# Patient Record
Sex: FEMALE | Race: WHITE
[De-identification: names, ages, dates, MRNs, and addresses within clinical notes are randomized per-mention and may not be internally consistent; named-entity substitution may affect disease eponyms.]

---

## 2017-05-03 NOTE — EMERGENCY DEPARTMENT RECORD
History of Present Illness





- General


Chief complaint: Swelling of legs


Stated complaint: SWELLING IN LEGS AND HIP PAIN


Time Seen by Provider: 17 13:59


Source: Patient, RN notes reviewed


Mode of Arrival: Ambulatory





- History of Present Illness


Initial comments: 


swelling of the left lower leg and some of the right lower leg and she had a 

left hip replacement 6 months ago.





Onset/Timin


-: Month(s)


Location: Left, Other


History of Same: No


Radiation: Proximal, Distal


Severity scale (1-10): 8


Quality: Aching, Dull


Consistency: Intermittent


Improves with: Elevation, Immobilization


Worsens with: Exertion, Walking, Weight bearing


Associated Symptoms: Denies other symptoms





- Related Data


 Previous Rx's











 Medication  Instructions  Recorded


 


Hydrocodone/Acetaminophen [Norco 1 tab PO Q6H PRN #10 tab 06/28/15





5mg/325mg]  


 


Ibuprofen [Motrin 600Mg] 600 mg PO Q6H #20 tablet 06/28/15











 Allergies











Allergy/AdvReac Type Severity Reaction Status Date / Time


 


naproxen AdvReac  VOMITING Verified 17 13:44














Travel Screening





- Travel/Exposure Within Last 30 Days


Have you traveled within the last 30 days?: No





Review of Systems


Reviewed: No additional complaints except as noted below


Constitutional: Reports: As per HPI.  Denies: Chills, Fever, Malaise, Night 

sweats, Weakness, Weight change


Eyes: Reports: As per HPI.  Denies: Eye discharge, Eye pain, Photophobia, 

Vision change


ENT: Reports: As per HPI.  Denies: Congestion, Dental pain, Ear pain, Epistaxis

, Hearing loss, Throat pain


Respiratory: Reports: As per HPI.  Denies: Cough, Dyspnea, Hemoptysis, Stridor, 

Wheezes


Cardiovascular: Reports: As per HPI.  Denies: Arrhythmia, Chest pain, Dyspnea 

on exertion, Edema, Murmurs, Orthopnea, Palpitations, Paroxysmal nocturnal 

dyspnea, Rheumatic Fever, Syncope


Endocrine: Reports: As per HPI.  Denies: Fatigue, Heat or cold intolerance, 

Polydipsia, Polyuria


Gastrointestinal: Reports: As per HPI.  Denies: Abdominal pain, Constipation, 

Diarrhea, Hematemesis, Hematochezia, Melena, Nausea, Vomiting


Genitourinary: Reports: As per HPI.  Denies: Abnormal menses, Discharge, 

Dyspareunia, Dysuria, Frequency, Hematuria, Incontinence, Retention, Urgency


Musculoskeletal: Reports: As per HPI.  Denies: Arthralgia, Back pain, Gout, 

Joint swelling, Myalgia, Neck pain


Skin: Reports: As per HPI.  Denies: Bruising, Change in color, Change in hair/

nails, Lesions, Pruritus, Rash


Neurological: Reports: As per HPI.  Denies: Abnormal gait, Confusion, Headache, 

Numbness, Paresthesias, Seizure, Tingling, Tremors, Vertigo, Weakness


Psychiatric: Reports: As per HPI.  Denies: Anxiety, Auditory hallucinations, 

Depression, Homicidal thoughts, Suicidal thoughts, Visual hallucinations


Hematological/Lymphatic: Reports: As per HPI.  Denies: Anemia, Blood Clots, 

Easy bleeding, Easy bruising, Swollen glands





Past Medical History





- SOCIAL HISTORY


Smoking Status: Current every day smoker


Alcohol Use: None


Drug Use: None





- RESPIRATORY


Hx Respiratory Disorders: Yes


Hx COPD: Yes





- CARDIOVASCULAR


Hx Cardio Disorders: No





- NEURO


Hx Neuro Disorders: No





- GI


Hx GI Disorders: Yes


Hx Hiatal Hernia: Yes





- 


Hx Genitourinary Disorders: Yes


Hx Kidney Stones: Yes





- ENDOCRINE


Hx Endocrine Disorders: No


Hx Diabetes: No


Hx Thyroid Disease: Yes





- MUSCULOSKELETAL


Hx Musculoskeletal Disorders: Yes


Comment:: Right hip problems  and Scoliosis





- PSYCH


Hx Psych Problems: Yes


Hx Anxiety: Yes


Hx Behavior Problems: Yes


Hx Depression: Yes


Comment:: Bipolar





- HEMATOLOGY/ONCOLOGY


Hx Hematology/Oncology Disorders: No





Family Medical History


Any Significant Family History?: Yes


Hx Alcohol Use: Brother/Sister


*Alcohol Comment: Brother OD on Methadone.


Hx Cancer: Mother


Hx Heart Disease: Brother/Sister





Physical Exam





- General


General Appearance: Alert, Oriented x3, Cooperative, No acute distress





- Head


Head exam: Normal inspection





- Eye


Eye exam: Normal appearance, PERRL


Pupils: Normal accommodation





- ENT


ENT exam: Normal exam, Mucous membranes moist, Normal external ear exam, Normal 

orophraynx, TM's normal bilaterally


Ear exam: Normal external inspection.  negative: External canal tenderness


Nasal Exam: Normal inspection.  negative: Discharge, Sinus tenderness


Mouth exam: Normal external inspection, Tongue normal


Teeth exam: Normal inspection.  negative: Dental caries


Throat exam: Normal inspection.  negative: Tonsillar erythema, Tonsillar exudate





- Neck


Neck exam: Normal inspection, Full ROM.  negative: Tenderness





- Respiratory


Respiratory exam: Normal lung sounds bilaterally.  negative: Respiratory 

distress





- Cardiovascular


Cardiovascular Exam: Regular rate, Normal rhythm, Normal heart sounds





- GI/Abdominal


GI/Abdominal exam: Soft, Normal bowel sounds.  negative: Tenderness





- Rectal


Rectal exam: Deferred





- 


 exam: Deferred





- Extremities


Extremities exam: Normal inspection, Full ROM, Normal capillary refill.  

negative: Tenderness





- Back


Back exam: Reports: Normal inspection, Full ROM.  Denies: Muscle spasm, Rash 

noted, Tenderness





- Neurological


Neurological exam: Alert, Normal gait, Oriented X3, Reflexes normal





- Psychiatric


Psychiatric exam: Normal affect, Normal mood





- Skin


Skin exam: Dry, Intact, Normal color, Warm





Course





 Vital Signs











  17





  13:35


 


Temperature 97.8 F


 


Pulse Rate 84


 


Respiratory 20





Rate 


 


Blood Pressure 128/61


 


Pulse Ox 97








venous dopler neg





Medical Decision Making





- Lab Data


Result diagrams: 


 17 14:25





 17 14:25





Disposition


Clinical Impression: 


 Lymphedema





Edema


Qualifiers:


 Edema type: unspecified Qualified Code(s): R60.9 - Edema, unspecified


Disposition: Home, Self-Care


Condition: (1) Good


Instructions:  Leg Edema (ED)


Additional Instructions: 


decrease salt in diet


follow up with family 


compression socks knee high


Forms:  Patient Portal Access


Time of Disposition: 16:13

## 2017-05-10 NOTE — US VENOUS DOPPLER REPORT
EXAM:  DUPLEX DOPPLER ULTRASOUND EXAMINATION OF THE LEFT LOWER EXTREMITY VEINS



HISTORY:  PATIENT HAS A HISTORY OF A LEFT HIP REPLACEMENT ON 11/07/16.  
OCCASIONAL SWELLING FROM KNEE TO FOOT SINCE THE SURGERY.  



TECHNIQUE:  Real-time gray scale and Duplex Doppler ultrasound examination of 
the left lower extremity veins was performed.  



Comparison:  CT scan of the abdomen and pelvis dated 3/13/17 is provided.  



FINDINGS:  The contour, caliber, compressibility, flow, and augmentation of the 
left common femoral, superficial femoral, popliteal, posterior tibial, peroneal
, and anterior tibial veins are within normal limits.  There is no sonographic 
evidence of a deep venous thrombosis within these structures.  



IMPRESSION:  

NO SONOGRAPHIC EVIDENCE OF A DEEP VENOUS THROMBOSIS WITHIN THE LEFT LOWER 
EXTREMITY VENOUS STRUCTURES AS DISCUSSED ABOVE.  



JOB NUMBER:  [id number]
[facility]

## 2017-06-16 NOTE — EMERGENCY DEPARTMENT RECORD
History of Present Illness





- General


Chief complaint: Pain


Stated complaint: HIP PAIN


Time Seen by Provider: 17 13:19


Source: Patient


Mode of Arrival: Ambulatory


Limitations: No limitations





- History of Present Illness


Initial comments: 





50 yo female presents to ED with a CC of chronic hip pain that she is currently 

being treated for by an orthopedist from the [place] area, and her prescription 

for her Percocet has been delayed until next week.  Patient reports that she 

has been taking Percocet while attempting to get a second opinion on her hip 

pain symptoms from Dr. Dennison and Dr. Rowley.  Patient denies new injury or 

trauma to the pelvis/leg.  Patient also reports that she has been taking 

Tramadol at home without much improvement.


MD Complaint: Joint pain


Onset/Timin


-: Days(s)


Location: Left


History of Same: Yes


Radiation: Proximal, Distal


Severity scale (1-10): 7


Quality: Aching


Consistency: Constant


Improves with: Nothing


Worsens with: Nothing


Associated Symptoms: Denies other symptoms





- Related Data


 Home Medications











 Medication  Instructions  Recorded  Confirmed  Last Taken


 


Tramadol HCl [Ultram] 50 mg PO BID 17 1 Day Ago





    ~06/15/17








 Previous Rx's











 Medication  Instructions  Recorded


 


Ibuprofen [Motrin 600Mg] 600 mg PO Q6H #20 tablet 06/28/15











 Allergies











Allergy/AdvReac Type Severity Reaction Status Date / Time


 


cephalexin [From Keflex] AdvReac  DIARRHEA Verified 17 13:02


 


naproxen AdvReac  VOMITING Verified 17 13:02














Travel Screening





- Travel/Exposure Within Last 30 Days


Have you traveled within the last 30 days?: No





- Travel/Exposure Within Last Year


Have you traveled outside the [place] in the last year?: No





- Additonal Travel Details


Have you been exposed to anyone with a communicable illness?: No





- Travel Symptoms


Symptom Screening: None





Review of Systems


Constitutional: Denies: Chills, Fever, Malaise, Night sweats


Eyes: Denies: Eye discharge, Eye pain


ENT: Denies: Congestion, Ear pain


Respiratory: Denies: Cough, Dyspnea


Cardiovascular: Denies: Chest pain, Dyspnea on exertion


Endocrine: Denies: Fatigue, Heat or cold intolerance


Gastrointestinal: Denies: Abdominal pain, Nausea, Vomiting


Genitourinary: Denies: Incontinence, Retention


Musculoskeletal: Reports: Arthralgia.  Denies: Back pain, Gout, Joint swelling


Skin: Denies: Bruising, Change in color


Neurological: Denies: Abnormal gait, Confusion, Headache, Seizure


Psychiatric: Denies: Anxiety


Hematological/Lymphatic: Denies: Anemia, Blood Clots





Past Medical History





- SOCIAL HISTORY


Smoking Status: Current every day smoker


Alcohol Use: None


Drug Use: None





- RESPIRATORY


Hx Respiratory Disorders: Yes


Hx COPD: Yes





- CARDIOVASCULAR


Hx Cardio Disorders: No





- NEURO


Hx Neuro Disorders: No





- GI


Hx GI Disorders: Yes


Hx Hiatal Hernia: Yes





- 


Hx Genitourinary Disorders: Yes


Hx Kidney Stones: Yes





- ENDOCRINE


Hx Endocrine Disorders: No


Hx Diabetes: No


Hx Thyroid Disease: Yes





- MUSCULOSKELETAL


Hx Musculoskeletal Disorders: Yes


Comment:: Right hip problems  and Scoliosis





- PSYCH


Hx Psych Problems: Yes


Hx Anxiety: Yes


Hx Behavior Problems: Yes


Hx Depression: Yes


Comment:: Bipolar





- HEMATOLOGY/ONCOLOGY


Hx Hematology/Oncology Disorders: No





Family Medical History


Any Significant Family History?: Yes


Hx Alcohol Use: Brother/Sister


*Alcohol Comment: Brother OD on Methadone.


Hx Cancer: Mother


Hx Heart Disease: Brother/Sister





Physical Exam





- General


General Appearance: Alert, Oriented x3, Cooperative, Other (talkative on 

examination)


Limitations: No limitations





- Head


Head exam: Atraumatic, Normocephalic, Normal inspection


Head exam detail: negative: Abrasion, Contusion, Orozco's sign, General 

tenderness, Hematoma, Laceration





- Eye


Eye exam: Normal appearance.  negative: Conjunctival injection, Periorbital 

swelling, Periorbital tenderness, Scleral icterus





- ENT


Ear exam: negative: Auricular hematoma, Auricular trauma


Nasal Exam: negative: Active bleeding, Discharge, Dried blood, Foreign body


Mouth exam: negative: Drooling, Laceration, Muffled voice, Tongue elevation





- Neck


Neck exam: Normal inspection.  negative: Meningismus, Tenderness





- Respiratory


Respiratory exam: Normal lung sounds bilaterally.  negative: Rales, Respiratory 

distress, Rhonchi, Stridor





- Cardiovascular


Cardiovascular Exam: Regular rate, Normal rhythm, Normal heart sounds





- GI/Abdominal


GI/Abdominal exam: Soft.  negative: Rebound, Rigid, Tenderness





- Rectal


Rectal exam: Deferred





- 


 exam: Deferred





- Extremities


Extremities exam: Tenderness.  negative: Calf tenderness, Pedal edema





- Back


Back exam: Denies: CVA tenderness (R), CVA tenderness (L)





- Neurological


Neurological exam: Alert, Normal gait, Oriented X3





- Psychiatric


Psychiatric exam: Normal affect, Normal mood





- Skin


Skin exam: Normal color.  negative: Abrasion


Type of lesion: negative: abrasion





Course





 Vital Signs











  17





  12:56


 


Temperature 97.4 F L


 


Pulse Rate 94 H


 


Respiratory 20





Rate 


 


Blood Pressure 120/96


 


Pulse Ox 98














- Reevaluation(s)


Reevaluation #1: 





17 13:34


MAPS reviewed, Patient has filled:


Oxycodone Aceta 10 mg #180 17


Oxycodone 10 mg #100 5/15/17


Oxycodone-Aceta #100 filled 5/10/17


Oxycodone-Aceta #180 filled 17





Patient has filled #560 Oxycodone tablets in 47 days, or 11.9 tablets daily.  

Will contact the patient's prescribing office (Dr. Castillo) for consultation of 

the patient's chronic pain needs.








Reevaluation #2: 





17 14:16


2nd call placed to Dr. Castillo's office, report that the PA will be returning 

call soon.


Reevaluation #3: 





17 14:37


Patient eloped from the ED prior to the patient's orthopedic surgeon returning 

call.  Patient did ride her back away from [facility] and ambulated with very steady 

gait from the facility.





Disposition


Disposition: Other (eloped)


Clinical Impression: 


Chronic hip pain


Qualifiers:


 Laterality: left Qualified Code(s): M25.552 - Pain in left hip





Disposition: Home, Self-Care


Condition: (2) Stable


Instructions:  Chronic Pain (ED)


Additional Instructions: 


Return to ED if your symptoms worsen or if you have any concerns.


Follow-up with Dr. Castillo in 3-5 days for your chronic hip pain symptoms.


Forms:  Patient Portal Access


Time of Disposition: 14:18

## 2017-10-25 NOTE — EMERGENCY DEPARTMENT RECORD
History of Present Illness





- General


Chief Complaint: General


Stated Complaint: OUT OF PAIN MEDICATION


Time Seen by Provider: 10/25/17 09:54


Source: Patient


Mode of Arrival: Ambulatory


Limitations: No limitations





- History of Present Illness


Initial comments: 


The patient is here due to being out of her medicines for her chronic hip and 

back pain. She denies any injury or trauma or any change in her pain. Her issue 

is that she ran out of her Percocet and Tramadol. She does thru about 200 

Percocet 10's ever 20 days and now is out until Sat 3 days from now. She states 

she was told to go to the ER for a pain medicine refill.





Onset/Timin


-: Days(s)





- Related Data


 Home Medications











 Medication  Instructions  Recorded  Confirmed  Last Taken


 


Oxycodone HCl/Acetaminophen 1 tab PO Q4H PRN 10/25/17 10/25/17 10/22/17





[Percocet 10mg/325mg]    








 Previous Rx's











 Medication  Instructions  Recorded


 


Ibuprofen [Motrin 600Mg] 600 mg PO Q6H #20 tablet 06/28/15











 Allergies











Allergy/AdvReac Type Severity Reaction Status Date / Time


 


cephalexin [From Keflex] AdvReac  DIARRHEA Verified 10/25/17 09:46


 


naproxen AdvReac  VOMITING Verified 10/25/17 09:46














Travel Screening





- Travel/Exposure Within Last 30 Days


Have you traveled within the last 30 days?: No





- Travel/Exposure Within Last Year


Have you traveled outside the [place] in the last year?: No





- Additonal Travel Details


Have you been exposed to anyone with a communicable illness?: No





- Travel Symptoms


Symptom Screening: None





Review of Systems


Constitutional: Denies: Chills, Fever


Eyes: Denies: Eye discharge


ENT: Denies: Congestion, Throat pain


Respiratory: Denies: Dyspnea





Past Medical History





- SOCIAL HISTORY


Smoking Status: Current every day smoker


Alcohol Use: None


Drug Use: None





- RESPIRATORY


Hx Respiratory Disorders: Yes


Hx COPD: Yes





- CARDIOVASCULAR


Hx Cardio Disorders: No





- NEURO


Hx Neuro Disorders: No





- GI


Hx GI Disorders: Yes


Hx Hiatal Hernia: Yes





- 


Hx Genitourinary Disorders: Yes


Hx Kidney Stones: Yes





- ENDOCRINE


Hx Endocrine Disorders: No


Hx Diabetes: No


Hx Thyroid Disease: Yes





- MUSCULOSKELETAL


Hx Musculoskeletal Disorders: Yes


Comment:: Right hip problems  and Scoliosis





- PSYCH


Hx Psych Problems: Yes


Hx Anxiety: Yes


Hx Behavior Problems: Yes


Hx Depression: Yes


Comment:: Bipolar





- HEMATOLOGY/ONCOLOGY


Hx Hematology/Oncology Disorders: No





Family Medical History


Any Significant Family History?: Yes


Hx Alcohol Use: Brother/Sister


*Alcohol Comment: Brother OD on Methadone.


Hx Cancer: Mother


Hx Heart Disease: Brother/Sister





Physical Exam





- General


General Appearance: Alert, Oriented x3, Cooperative, No acute distress





- Head


Head exam: Atraumatic, Normocephalic





- Eye


Eye exam: Normal appearance, PERRL





- Neck


Neck exam: Normal inspection, Full ROM.  negative: Tenderness





- Respiratory


Respiratory exam: Normal lung sounds bilaterally.  negative: Respiratory 

distress





- Cardiovascular


Cardiovascular Exam: Regular rate, Normal rhythm, Normal heart sounds





- Extremities


Extremities exam: Normal inspection, Full ROM, Normal capillary refill.  

negative: Tenderness





- Neurological


Neurological exam: Alert, Normal gait.  negative: Abnormal gait, Motor sensory 

deficit





Course





 Vital Signs











  10/25/17





  09:49


 


Temperature 97.6 F


 


Pulse Rate 87


 


Respiratory 18





Rate 


 


Blood Pressure 149/105


 


Pulse Ox 99














- Reevaluation(s)


Reevaluation #1: 


I did discuss with the patient that I do not refill chronic pain medicines. She 

is to call her PCP for further meds.


10/25/17 10:12








Disposition


Disposition: Discharge


Clinical Impression: 


 Chronic pain disorder





Disposition: Home, Self-Care


Condition: (2) Stable


Instructions:  Chronic Pain (ED)


Additional Instructions: 


Please take the Tramadol as directed. Please contact your PCP for further pain 

medicines. Return to the ER if worse.


Forms:  Patient Portal Access


Time of Disposition: 10:02





Quality





- Quality Measures


Quality Measures: N/A





- Blood Pressure Screening


View Details: Yes


Does Patient Have Any of the Following: No


Blood Pressure Classification: Hypertensive Reading


Systolic Measurement: 149


Diastolic Measurement: 105


Screening for High Blood Pressure: < Pre-Hypertensive BP, F/U Documented > [

]


Pre-Hypertensive Follow-up Interventions: Referral to alternative/primary care 

provider.

## 2017-11-08 NOTE — EMERGENCY DEPARTMENT RECORD
History of Present Illness





- General


Chief complaint: Lower Extremity Pain


Stated complaint: HIP PAIN


Time Seen by Provider: 17 22:48


Source: Patient, EMS


Mode of Arrival: EMS


Limitations: No limitations





- History of Present Illness


Initial comments: 





50 yo female presents to ED for evaluation of left hip pain and possible 

dislocation following hip revision surgery at [facility] 6 days ago.  Patient 

reports that she was sitting in a chair with her leg elevated when her hip 

dislocated.  Patient presents to ED via EMS, received Fentanyl 100 mcg prior to 

arrival.  Patient denies fall or other injury.


MD Complaint: Extremity pain, Joint pain


Onset/Timin


-: Minutes(s)


Location: Left


-: Yes Arthralgia


Radiation: Proximal


Severity scale (1-10): 10


Quality: Aching


Consistency: Constant


Improves with: Nothing


Worsens with: Nothing


Associated Symptoms: Denies other symptoms





- Related Data


 Home Medications











 Medication  Instructions  Recorded  Confirmed  Last Taken


 


Aspirin 325 mg PO DAILY 17











 Allergies











Allergy/AdvReac Type Severity Reaction Status Date / Time


 


cephalexin [From Keflex] AdvReac  DIARRHEA Verified 10/25/17 09:46


 


naproxen AdvReac  VOMITING Verified 10/25/17 09:46














Travel Screening





- Travel/Exposure Within Last 30 Days


Have you traveled within the last 30 days?: No





- Travel Symptoms


Symptom Screening: None





Review of Systems


Constitutional: Denies: Chills, Fever, Malaise, Night sweats


Eyes: Denies: Eye discharge, Eye pain


ENT: Denies: Congestion, Ear pain, Epistaxis


Respiratory: Denies: Cough, Dyspnea


Cardiovascular: Denies: Chest pain, Dyspnea on exertion


Endocrine: Denies: Fatigue, Heat or cold intolerance


Gastrointestinal: Denies: Abdominal pain, Nausea, Vomiting


Genitourinary: Denies: Incontinence, Retention


Musculoskeletal: Reports: Arthralgia.  Denies: Back pain, Gout, Joint swelling


Skin: Denies: Bruising, Change in color


Neurological: Denies: Abnormal gait, Confusion, Headache, Seizure


Psychiatric: Denies: Anxiety


Hematological/Lymphatic: Denies: Anemia, Blood Clots





Past Medical History





- SOCIAL HISTORY


Smoking Status: Current every day smoker





- RESPIRATORY


Hx Respiratory Disorders: Yes


Hx COPD: Yes





- CARDIOVASCULAR


Hx Cardio Disorders: No





- NEURO


Hx Neuro Disorders: No





- GI


Hx GI Disorders: Yes


Hx Hiatal Hernia: Yes





- 


Hx Genitourinary Disorders: Yes


Hx Kidney Stones: Yes





- ENDOCRINE


Hx Endocrine Disorders: Yes


Hx Diabetes: No


Hx Thyroid Disease: Yes





- MUSCULOSKELETAL


Hx Musculoskeletal Disorders: Yes


Comment:: Right hip problems  and Scoliosis





- PSYCH


Hx Psych Problems: Yes


Hx Anxiety: Yes


Hx Behavior Problems: Yes


Hx Depression: Yes


Comment:: Bipolar





- HEMATOLOGY/ONCOLOGY


Hx Hematology/Oncology Disorders: No





Family Medical History


Any Significant Family History?: Yes


Hx Alcohol Use: Brother/Sister


*Alcohol Comment: Brother OD on Methadone.


Hx Cancer: Mother


Hx Heart Disease: Brother/Sister





Physical Exam





- General


General Appearance: Alert, Oriented x3, Other (Crying on examination, tearful 

and anxious, slurred speech on examination)


Limitations: Other





- Head


Head exam: Atraumatic, Normocephalic, Normal inspection


Head exam detail: negative: Abrasion, Contusion, Orozco's sign, General 

tenderness, Hematoma, Laceration





- Eye


Eye exam: Normal appearance.  negative: Conjunctival injection, Periorbital 

swelling, Periorbital tenderness, Scleral icterus





- ENT


Ear exam: negative: Auricular hematoma, Auricular trauma


Nasal Exam: negative: Active bleeding, Discharge, Dried blood, Foreign body


Mouth exam: negative: Drooling, Laceration, Muffled voice, Tongue elevation





- Neck


Neck exam: Normal inspection.  negative: Meningismus, Tenderness





- Respiratory


Respiratory exam: Normal lung sounds bilaterally.  negative: Rales, Respiratory 

distress, Rhonchi, Stridor





- Cardiovascular


Cardiovascular Exam: Normal heart sounds, Tachycardia


Peripheral Pulses: 3+: Dorsalis Pedis (L)





- GI/Abdominal


GI/Abdominal exam: Soft.  negative: Rebound, Rigid, Tenderness





- Rectal


Rectal exam: Deferred





- 


 exam: Deferred





- Extremities


Extremities exam: Tenderness, Other (TTP along the proximal left hip, staples 

are still in place without evidence for infection or bleeding, patient is 

unable to straighten the lower extremity due to her pain symptoms.).  negative: 

Calf tenderness, Pedal edema





- Back


Back exam: Denies: CVA tenderness (R), CVA tenderness (L)





- Neurological


Neurological exam: Alert, Oriented X3





- Psychiatric


Psychiatric exam: Anxious





- Skin


Skin exam: Normal color.  negative: Abrasion


Type of lesion: negative: abrasion





Course





 Vital Signs











  17





  22:44


 


Temperature 98.1 F


 


Pulse Rate 112 H


 


Respiratory 24





Rate 


 


Blood Pressure 140/102


 


Pulse Ox 96














- Reevaluation(s)


Reevaluation #1: 


2017 23:05


Patient was seen examined, has received another 50 mcg Fentanyl (received 100 

mcg prior to arrival) before going to radiology for imaging.  Due to possible 

intoxication, will monitor closely for respiratory depression symptoms.





17 23:18


Labs reviewed, Hgb 10.6, labs are otherwise grossly unremarkable for an acute 

process.





Left Hip Portable: Superior dislocation of the left hip prostesis





Will page on-call for Dr. Castillo ([facility]) re: dislocation in recent post-

op patient.  Dilaudid 0.5 mg ordered for continued, intermittent pain symptoms.








Reevaluation #2: 





17 23:32


Patient received Dilaudid 0.5 mg 3 minutes ago, is now talking with family 

member on the phone in no distress.


Reevaluation #3: 





17 23:40


Patient called her orthopedist from her mobile phone, I did speak with Dr. Castillo and he recommended attempting to reduce the hip if possible.  Will 

discuss sedation and reduction with the patient at his time.


Reevaluation #4: 





17 00:26


Post-reduction films reviewed, hip prostesis was satisfactory reduced with 

satisfactory placement.  Patient is awake and conversing with nursing staff, 

will perform PO trial in 10-15 minutes.  Patient's fiance was updated on post-

reduction procedure.


Reevaluation #5: 





17 00:54


Patient ambulating with steady gait to the bathroom with a walker while talking 

on the phone, appears stable for discharge at this time.





Procedures





- Orthopedic Joint Reduction


  ** Joint #1


Consent Obtained: Verbal consent


Time Out Performed: Yes


Side: Left


Joint Reduction Location: Hip


Analgesia: None


Technique Used: Traction/counter-traction


Post-Reduction Neuro Exam: Intact


Post-Reduction Vascular Exam: Intact


Post Reduction X-Ray Obtained: Yes


Post Reduction X-Ray Results: Reduced


Splint Applied: No


Patient Tolerated Procedure: Good, No complications





- Procedural Sedation


Indications: fracture/dislocation redu


ASA Class: II


Mallampati Airway Score: 2


Time of Last PO Intake: 20:00


Preparation: cardiac monitor applied, pulse oximeter, capnometry used, 

supplemental O2 applied, suction/airway equipment at bedside, IV secured


IV Etomidate Dose (mgs): 28


Complications: none


Patient Tolerated Procedure: Good


Sedation Start Date: 17


Sedation Start Time: 00:03


Sedation End Date: 17


Sedation End Time: 00:18





Medical Decision Making





- Lab Data


Result diagrams: 


 17 22:52





 17 22:52





Disposition


Disposition: Discharge


Clinical Impression: 


Hip dislocation, left


Qualifiers:


 Encounter type: initial encounter Qualified Code(s): S73.005A - Unspecified 

dislocation of left hip, initial encounter





Disposition: Home, Self-Care


Condition: (2) Stable


Instructions:  Hip Dislocation (ED)


Additional Instructions: 


Return to ED if your symptoms worsen or if you have any concerns.


Follow-up with Dr. Castillo in 3-5 days as directed.


Forms:  Patient Portal Access


Time of Disposition: 00:31





Quality





- Quality Measures


Quality Measures: N/A





- Blood Pressure Screening


Does Patient Have Any of the Following: No


Blood Pressure Classification: Hypertensive Reading


Systolic Measurement: 145


Diastolic Measurement: 91


Screening for High Blood Pressure: < First Hypertensive BP, F/U Documented > [

]


First Hypertensive Follow-up Interventions: Referral to alternative/primary 

care provider.

## 2017-11-09 NOTE — RADIOLOGY REPORT
EXAM:  AP PELVIS



HISTORY:  LEFT HIP POPPED OUT WHILE SITTING IN CHAIR, REVISION HIP REPLACEMENT 
ONE WEEK AGO.  



TECHNIQUE:  AP view of the pelvis was obtained.  



Comparison:  Hip radiograph 6/08/17.  Pelvis CT 3/13/17.  



FINDINGS:  Laminectomy change in the lower lumbar spine with partially 
visualized fusion rods.  Lucency surrounding the S1 screws consistent with 
loosening.  Bilateral hip arthroplasties.  There is superior dislocation of the 
left femoral component.  There is Protrusio acetabuli on the left of mild 
degree with transverse lucency in the mid left acetabulum which may relate to 
partially healed fracture as seen on prior pelvis CT.  



IMPRESSION:  

1.  SUPERIOR DISLOCATION OF THE LEFT PROSTHETIC FEMORAL COMPONENT.  



2.  PERSISTENT PROTRUSIO ACETABULI ON THE LEFT WITH THIN CRESCENT OF LUCENCY 
SURROUNDING THE ACETABULAR COMPONENT.  ALSO TRANSVERSE LUCENCY OF THE MID LEFT 
ACETABULUM APPEARING SIMILAR TO PRIOR STUDIES WHICH COULD RELATE TO PARTIALLY 
HEALED FRACTURE OF THE LEFT ACETABULUM.  



3.  LUMBAR FUSION RODS WITH LUCENCY ALONG THE S1 SCREWS WHICH MAY REFLECT 
LOOSENING AS WELL.  



JOB NUMBER:  [id number]
MTDD

## 2017-11-09 NOTE — RADIOLOGY REPORT
EXAM:  AP PELVIS



HISTORY:  POST REDUCTION DISLOCATED LEFT HIP PROSTHESIS.  



TECHNIQUE:  AP view of the pelvis was obtained.  



Comparison:  Pelvis CT 3/13/17.  Pelvis radiograph 11/08/17.  



Encounter:  Initial.



FINDINGS:  Interval reduction of dislocated left femoral component.  Anatomic 
alignment.  There is persistent Protrusio acetabuli on the left with a thin 
crescent of lucency surrounding the left acetabular component measuring up to 2-
3 mm.  There is a subtle transverse lucency of the acetabulum appearing similar 
to studies dating back to the pelvis CT of 3/13/17 which may relate to 
partially healed fracture.  



IMPRESSION:  

1.  STATUS POST REDUCTION OF DISLOCATED LEFT FEMORAL COMPONENT NOW WITH 
ANATOMIC ALIGNMENT.  



2.  CHRONIC PROTRUSIO ACETABULI ON THE LEFT WITH THIN CRESCENT OF LUCENCY 
SURROUNDING THE ACETABULAR COMPONENT.  SMALL PARTICLE DISEASE IS NOT EXCLUDED.  
SUBTLE TRANSVERSE LUCENCY OF THE ACETABULUM WHICH COULD RELATE TO PARTIALLY 
HEALED FRACTURE SIMILAR TO STUDIES DATING BACK TO MARCH OF 2017.  



JOB NUMBER:  [id number]
MTDD

## 2017-11-21 NOTE — EMERGENCY DEPARTMENT RECORD
History of Present Illness





- General


Chief complaint: Pain


Stated complaint: HIP PAIN


Time Seen by Provider: 17 19:04


Source: Patient, EMS


Mode of Arrival: EMS


Limitations: No limitations





- History of Present Illness


Initial comments: 





52 yo female presents to ED for evaluation of a dislocation of the left hip.  

Patient underwent hip revision surgery 2.5 weeks ago, had a dislocation 

approximately 11 days ago.  Patient reports that she was taking out her garbage 

today, turned, and felt the joint dislocate.  Patient denies trauma or fall 

injury.


MD Complaint: Joint pain


Onset/Timin


-: Minutes(s)


Location: Left


History of Same: Yes


Severity scale (1-10): 7


Quality: Aching


Consistency: Constant


Improves with: Nothing


Worsens with: Exertion, Walking, Weight bearing


Associated Symptoms: Denies other symptoms





- Related Data


 Allergies











Allergy/AdvReac Type Severity Reaction Status Date / Time


 


cephalexin [From Keflex] AdvReac  DIARRHEA Verified 10/25/17 09:46


 


naproxen AdvReac  VOMITING Verified 10/25/17 09:46














Travel Screening





- Travel/Exposure Within Last 30 Days


Have you traveled within the last 30 days?: No





Review of Systems


Constitutional: Denies: Chills, Fever, Malaise, Night sweats


Eyes: Denies: Eye discharge, Eye pain


ENT: Denies: Congestion, Ear pain, Epistaxis


Respiratory: Denies: Cough, Dyspnea


Cardiovascular: Denies: Chest pain, Dyspnea on exertion


Endocrine: Denies: Fatigue, Heat or cold intolerance


Gastrointestinal: Denies: Abdominal pain, Nausea, Vomiting


Genitourinary: Denies: Incontinence, Retention


Musculoskeletal: Reports: Arthralgia.  Denies: Gout, Joint swelling


Skin: Denies: Bruising, Change in color


Neurological: Denies: Abnormal gait, Confusion, Headache, Seizure


Psychiatric: Denies: Anxiety


Hematological/Lymphatic: Denies: Anemia, Blood Clots





Past Medical History





- SOCIAL HISTORY


Smoking Status: Current every day smoker


Alcohol Use: None


Drug Use: None





- RESPIRATORY


Hx Respiratory Disorders: Yes


Hx COPD: Yes





- CARDIOVASCULAR


Hx Cardio Disorders: No





- NEURO


Hx Neuro Disorders: No





- GI


Hx GI Disorders: Yes


Hx Hiatal Hernia: Yes





- 


Hx Genitourinary Disorders: Yes


Hx Kidney Stones: Yes





- ENDOCRINE


Hx Endocrine Disorders: Yes


Hx Diabetes: No


Hx Thyroid Disease: Yes





- MUSCULOSKELETAL


Hx Musculoskeletal Disorders: Yes


Comment:: Right hip problems  and Scoliosis





- PSYCH


Hx Psych Problems: Yes


Hx Anxiety: Yes


Hx Behavior Problems: Yes


Hx Depression: Yes


Comment:: Bipolar





- HEMATOLOGY/ONCOLOGY


Hx Hematology/Oncology Disorders: No





Family Medical History


Any Significant Family History?: Yes


Hx Alcohol Use: Brother/Sister


*Alcohol Comment: Brother OD on Methadone.


Hx Cancer: Mother


Hx Heart Disease: Brother/Sister





Physical Exam





- General


General Appearance: Alert, Oriented x3, Cooperative, Moderate distress


Limitations: No limitations





- Head


Head exam: Atraumatic, Normocephalic, Normal inspection


Head exam detail: negative: Abrasion, Contusion, Orozco's sign, General 

tenderness, Hematoma, Laceration





- Eye


Eye exam: Normal appearance.  negative: Conjunctival injection, Periorbital 

swelling, Periorbital tenderness, Scleral icterus





- ENT


Ear exam: negative: Auricular hematoma, Auricular trauma


Nasal Exam: negative: Active bleeding, Discharge, Dried blood, Foreign body


Mouth exam: negative: Drooling, Laceration, Muffled voice, Tongue elevation





- Neck


Neck exam: Normal inspection.  negative: Meningismus, Tenderness





- Respiratory


Respiratory exam: Normal lung sounds bilaterally.  negative: Rales, Respiratory 

distress, Rhonchi, Stridor





- Cardiovascular


Cardiovascular Exam: Normal rhythm, Normal heart sounds, Tachycardia





- GI/Abdominal


GI/Abdominal exam: Soft.  negative: Rebound, Rigid, Tenderness





- Rectal


Rectal exam: Deferred





- 


 exam: Deferred





- Extremities


Extremities exam: Tenderness, Other (TTP and fullness to the left hip anteriorly

, LE appears externally rotated on examination).  negative: Calf tenderness, 

Pedal edema





- Back


Back exam: Denies: CVA tenderness (R), CVA tenderness (L)





- Neurological


Neurological exam: Alert, Oriented X3





- Psychiatric


Psychiatric exam: Anxious





- Skin


Skin exam: Normal color.  negative: Abrasion


Type of lesion: negative: abrasion





Course





 Vital Signs











  17





  19:08


 


Temperature 978 F H


 


Pulse Rate [ 115 H





Pulse Ox Probe] 


 


Respiratory 22





Rate 


 


Blood Pressure 132/95





[Right Arm] 


 


Pulse Ox 98














- Reevaluation(s)


Reevaluation #1: 





17 19:44


Hip/Pelvis: Anterior/superior dislocation left hip





Etomidate ordered for reduction of the hip under procedural sedation.


Reevaluation #2: 





17 21:01


Left hip has been reduced on reduction films. 





2nd Procedural Sedation Note:


Patient was sedated with Versed 3 mg and Etomidate 10 mg, start time: 20:46, 

end time 20:50.


Oxygen was applied, on cardiac monitor, no respiratory complications.


Patient tolerated the procedure well, no complications.


Reevaluation #3: 





17 21:21


Post-reduction film Left hip: Satisfactory reduction of the hip prosthesis, no 

fracture or other acute process noted.





Patient has ambulated to the bathroom with walker and steady gait, appears 

stable for discharge at this time with instructions to follow-up with Dr. Castillo in 1-3 days as directed.





Procedures





- Orthopedic Joint Reduction


  ** Joint #1


Consent Obtained: Verbal consent


Time Out Performed: Yes


Side: Left


Joint Reduction Location: Hip


Analgesia: None


Technique Used: Direct manipulation, Traction/counter-traction


Post-Reduction Neuro Exam: Intact


Post-Reduction Vascular Exam: Intact


Post Reduction X-Ray Obtained: Yes


Post Reduction X-Ray Results: Not reduced


Splint Applied: No


Patient Tolerated Procedure: Good





  ** Joint #2


Consent Obtained: Verbal consent


Time Out Performed: Yes


Side: Left


Joint Reduction Location: Hip


Analgesia: None


Technique Used: Direct manipulation, Traction/counter-traction


Post-Reduction Neuro Exam: Intact


Post-Reduction Vascular Exam: Intact


Post Reduction X-Ray Obtained: Yes


Post Reduction X-Ray Results: Reduced


Splint Applied: No


Patient Tolerated Procedure: Good





- Procedural Sedation


Indications: fracture/dislocation redu


ASA Class: III


Mallampati Airway Score: 3


Preparation: cardiac monitor applied, supplemental O2 applied, suction/airway 

equipment at bedside


IV Etomidate Dose (mgs): 30


Complications: none


Interventions: oxygen applied


Patient Tolerated Procedure: Good, No complications


Sedation Start Date: 17


Sedation Start Time: 07:58


Sedation End Date: 17


Sedation End Time: 08:12





Disposition


Disposition: Discharge


Clinical Impression: 


Hip dislocation, left


Qualifiers:


 Encounter type: initial encounter Qualified Code(s): S73.005A - Unspecified 

dislocation of left hip, initial encounter





Disposition: Home, Self-Care


Condition: (2) Stable


Instructions:  Hip Dislocation (ED)


Additional Instructions: 


Return to ED if your symptoms worsen or if you have any concerns.


Follow-up with Dr. Castillo in 1-3 days as directed.


Forms:  Patient Portal Access


Time of Disposition: :





Quality





- Quality Measures


Quality Measures: N/A





- Blood Pressure Screening


Does Patient Have Any of the Following: No


Blood Pressure Classification: Hypertensive Reading


Systolic Measurement: 132


Diastolic Measurement: 95


Screening for High Blood Pressure: < Second Hypertensive BP, F/U Documented > [

]


Second Hypertensive Follow-up Interventions: Referral to alternative/primary 

care provider.

## 2017-11-22 NOTE — RADIOLOGY REPORT
EXAM:  AP VIEW OF THE PELVIS AND TWO VIEWS OF THE LEFT HIP



HISTORY:  PATIENT STATES HER LEFT HIP IS DISLOCATED.  



TECHNIQUE:  AP view of the pelvis and two views of the left hip are provided 
along with a comparison study of the left hip dated 11/09/17.  



FINDINGS:  There is anterior and superior dislocation of the left femoral head 
component with respect to the acetabular component of the left total hip 
arthroplasty.  



The lucency identified within the osseous acetabulum adjacent to the acetabular 
component is again visualized and may represent a chronic, healed process.  



Postoperative changes of the lower lumbar spine and right total hip 
arthroplasty is partially visualized as well.  Degenerative changes of the 
sacroiliac joints are identified.  



No obvious periimplant fractures are identified within the left total hip 
arthroplasty.  



IMPRESSION:  

ANTERIOR AND SUPERIOR DISLOCATION OF THE LEFT FEMORAL HEAD COMPONENT WITH 
RESPECT TO THE ACETABULAR COMPONENT OF THE LEFT TOTAL HIP ARTHROPLASTY IS NOTED 
AS DISCUSSED ABOVE.  NO OBVIOUS PERIIMPLANT FRACTURES ARE IDENTIFIED.  



JOB NUMBER:  [id number]
MTDD

## 2017-11-22 NOTE — RADIOLOGY REPORT
EXAM:  LEFT HIP, THREE VIEWS 



HISTORY:  PATIENT IS STATUS POST REDUCTION.   



TECHNIQUE:  Three views of the left hip are provided along with the comparison 
study dated 11/21/17.



FINDINGS:  In the interval the left femoral head component of the arthroplasty 
has been reduced into the acetabular component.  Alignment is anatomic.  No 
obvious acute periimplant fractures are identified.  



As noted on the previous examination the linear lucency identified adjacent to 
the acetabular component appears similar to the previous examinations.  This 
may represent a healed fracture.  



IMPRESSION:  

INTERVAL REDUCTION IN THE LEFT TOTAL HIP ARTHROPLASTY WITH RESPECT TO THE PRIOR 
EXAMINATION AS DISCUSSED ABOVE.  



JOB NUMBER:  [id number]
[facility]

## 2017-11-27 NOTE — EMERGENCY DEPARTMENT RECORD
History of Present Illness





- General


Chief complaint: Pain


Stated complaint: HIP PAIN


Time Seen by Provider: 17 13:47


Source: Patient, EMS


Mode of Arrival: EMS


Limitations: No limitations





- History of Present Illness


Initial comments: 


52 yo female presents with a third suspected hip dislocation this month.  She 

was in the bathroom and turned to grab her toothbrush.  She felt a pop and the 

hip dislocated.  No numbness or tingling.  Her orthopedist is at [facility].  Dr Castillo.  Her hip was replaced on .  Her other dislocations 

were on  and 17.  She has a follow up appointment this Friday. 





MD Complaint: Extremity pain, Joint pain


Onset/Timin


-: Minutes(s)


History of Same: Yes


-: Yes Arthralgia


Radiation: Distal


Severity scale (1-10): 10


Quality: Aching


Consistency: Constant


Improves with: Nothing


Worsens with: Nothing


Associated Symptoms: Denies other symptoms





- Related Data


 Allergies











Allergy/AdvReac Type Severity Reaction Status Date / Time


 


cephalexin [From Keflex] AdvReac  DIARRHEA Verified 17 13:48


 


naproxen AdvReac  VOMITING Verified 17 13:48














Travel Screening





- Travel/Exposure Within Last 30 Days


Have you traveled within the last 30 days?: No





Review of Systems


Constitutional: Denies: Chills, Fever, Malaise, Weakness


Eyes: Denies: Eye discharge


ENT: Denies: Congestion, Throat pain


Respiratory: Denies: Cough


Cardiovascular: Denies: Chest pain, Palpitations, Syncope


Endocrine: Denies: Fatigue


Gastrointestinal: Denies: Abdominal pain, Diarrhea, Nausea, Vomiting


Genitourinary: Denies: Dysuria, Urgency


Musculoskeletal: Reports: As per HPI, Arthralgia, Myalgia.  Denies: Back pain


Skin: Denies: Bruising, Change in color, Rash


Neurological: Denies: Headache, Numbness, Weakness


Psychiatric: Denies: Anxiety


Hematological/Lymphatic: Denies: Blood Clots, Easy bleeding, Easy bruising, 

Swollen glands





Past Medical History





- SOCIAL HISTORY


Smoking Status: Current every day smoker


Alcohol Use: None


Drug Use: None





- RESPIRATORY


Hx Respiratory Disorders: Yes


Hx COPD: Yes





- CARDIOVASCULAR


Hx Cardio Disorders: No





- NEURO


Hx Neuro Disorders: No





- GI


Hx GI Disorders: Yes


Hx Hiatal Hernia: Yes





- 


Hx Genitourinary Disorders: Yes


Hx Kidney Stones: Yes





- ENDOCRINE


Hx Endocrine Disorders: Yes


Hx Diabetes: No


Hx Thyroid Disease: Yes





- MUSCULOSKELETAL


Hx Musculoskeletal Disorders: Yes


Comment:: Right hip problems  and Scoliosis





- PSYCH


Hx Psych Problems: Yes


Hx Anxiety: Yes


Hx Behavior Problems: Yes


Hx Depression: Yes


Comment:: Bipolar





- HEMATOLOGY/ONCOLOGY


Hx Hematology/Oncology Disorders: No





Family Medical History


Any Significant Family History?: Yes


Hx Alcohol Use: Brother/Sister


*Alcohol Comment: Brother OD on Methadone.


Hx Cancer: Mother


Hx Heart Disease: Brother/Sister





Physical Exam





- General


General Appearance: Alert, Oriented x3, Cooperative, No acute distress


Limitations: No limitations





- Head


Head exam: Atraumatic, Normocephalic, Normal inspection





- Eye


Eye exam: Normal appearance.  negative: Conjunctival injection, Scleral icterus





- ENT


ENT exam: Normal exam, Mucous membranes moist


Ear exam: Normal external inspection


Nasal Exam: Normal inspection


Mouth exam: Normal external inspection


Teeth exam: Normal inspection





- Neck


Neck exam: Normal inspection, Full ROM.  negative: Tenderness





- Respiratory


Respiratory exam: Normal lung sounds bilaterally.  negative: Accessory muscle 

use, Decreased breath sounds, Prolonged expiratory, Respiratory distress, 

Rhonchi, Stridor, Wheezes





- Cardiovascular


Cardiovascular Exam: Regular rate, Normal rhythm, Normal heart sounds


Peripheral Pulses: 2+: Dorsalis Pedis (L)





- GI/Abdominal


GI/Abdominal exam: Soft.  negative: Tenderness





- Rectal


Rectal exam: Deferred





- 


 exam: Deferred





- Extremities


Extremities exam: Tenderness.  negative: Normal inspection, Full ROM


Image of Full Body: 


  __________________________














  __________________________





 1 - tender hip with ROM, external rotation, intact pulses and sensation








- Back


Back exam: Denies: CVA tenderness (R), CVA tenderness (L)





- Neurological


Neurological exam: Alert, Normal gait, Oriented X3.  negative: Motor sensory 

deficit





- Psychiatric


Psychiatric exam: Normal affect, Normal mood.  negative: Agitated, Anxious





- Skin


Skin exam: Dry, Intact, Normal color, Warm





Course





 Vital Signs











  17





  13:44


 


Temperature 98.0 F


 


Pulse Rate 125 H


 


Respiratory 20





Rate 


 


Blood Pressure 116/88


 


Pulse Ox 98














- Reevaluation(s)


Reevaluation #1: 


XR was ordered


I discussed sedation risks and benefits


Pain medication ordered


Consent for sedation ordered


17 14:04





17 14:42


The patient XR was reviewed


Anterior superior dislocation, no fracture


Time out performed


See Procedure Note


Time out performed


Start Time 14:20


Finish Time 15:15


17 16:14


At this time the patient is refusing transfer to [facility] regardless of the 

doctors instructions


I have called for the on-call physician for Dr Castillo





The post reduction hip XR demonstrated satisfactory reduction of the left hip.


17 16:47


I SW Dr De La Garza of [facility]


We discussed the three dislocations and today's findings


She recommends a knee immobilizer and continued abduction pillow


The patient can follow up as scheduled with Dr Castillo this week.





17 16:50





17 16:57


The patient was informed of the consult by phone with her doctor's on call 

partner


She has recovered very well without any pain, nausea, or confusion.


She is calling family for a ride home


She is ambulating without difficulty


No pain with walking


17 17:04


The instructions were provided to the patient and the son who is now here


The patient is very eager for DC


She states she will call to be seen tomorrow at [facility]





17 17:06





17 17:09


The HR is still elevated


I informed of this and I recommend staying longer


She refuses.  She is fully alert, no confusion.  She was informed of my 

concerns.  She states she is ready for DC and will not be willing to stay 

longer for observation.  She is fully able to make her own decisions at this 

time.  She is clear of thought, ambulating well, and understands my concerns.  

I explained the AMA process for leaving.  She understands the risks.  She 

accepts signing out AMA


17 19:36








Disposition


Disposition: Discharge


Clinical Impression: 


 Hip dislocation, left





Disposition: Against Medical Advice


Condition: (1) Good


Instructions:  Hip Dislocation (ED), Against Medical Advice (ED)


Additional Instructions: 


Where the knee immobilizer as directed


Use the pillow as instructed by your doctor between the legs at night


Call tomorrow for the next available appointment with your orthopedic doctor or 

follow up Friday as scheduled


Forms:  Patient Portal Access


Time of Disposition: 16:50





Quality





- Quality Measures


Quality Measures: N/A





- Blood Pressure Screening


Does Patient Have Any of the Following: No


Blood Pressure Classification: Pre-Hypertensive BP Reading


Systolic Measurement: 116


Diastolic Measurement: 88


Screening for High Blood Pressure: < Pre-Hypertensive BP, F/U Documented > [

]


Pre-Hypertensive Follow-up Interventions: Referral to alternative/primary care 

provider.

## 2017-11-28 NOTE — RADIOLOGY REPORT
DATE:  11/27/2017. 



EXAM:  LEFT HIP.



HISTORY:  Hip dislocation.



TECHNIQUE:  Two views of the left hip were performed.



FINDINGS:  There is satisfactory reduction of the left hip dislocation.  No 
associated fracture.



IMPRESSION:  

SATISFACTORY REDUCTION OF LEFT HIP DISLOCATION.



JOB NUMBER:  [id number]
MTDD

## 2017-11-28 NOTE — RADIOLOGY REPORT
DATE:  11/27/2017. 



EXAM:  LEFT HIP.



HISTORY:  Dislocation.



TECHNIQUE:  AP and lateral views of the left hip were performed.



FINDINGS:  There is superior dislocation of the femoral component.  No evidence 
of fracture.



IMPRESSION:  

SUPERIOR DISLOCATION OF THE FEMORAL COMPONENT.  NO EVIDENCE OF FRACTURE.



JOB NUMBER:  [id number]
MTDD

## 2017-12-31 NOTE — EMERGENCY DEPARTMENT RECORD
History of Present Illness





- General


Chief complaint: Pain


Stated complaint: HIP  PAIN


Time Seen by Provider: 17 13:25


Source: Patient


Mode of Arrival: EMS


Limitations: No limitations





- History of Present Illness


Initial comments: 





51 yo female presents to ED for recurrent hip dislocation just prior to 

arrival.  Patient reports that she was sitting in a friend's cough, attempted 

to stand and felt her hip "slide out" again.  Patient was recently released 

from being home bound for numerous dislocations.


MD Complaint: Joint pain


Onset/Timin


-: Minutes(s)


Location: Left, Thigh


Radiation: Proximal, Distal


Severity scale (1-10): 7


Quality: Aching


Consistency: Constant


Improves with: Nothing


Worsens with: Nothing





- Related Data


 Allergies











Allergy/AdvReac Type Severity Reaction Status Date / Time


 


cephalexin [From Keflex] AdvReac  DIARRHEA Verified 17 12:59


 


naproxen AdvReac  VOMITING Verified 17 12:59














Travel Screening





- Travel/Exposure Within Last 30 Days


Have you traveled within the last 30 days?: No





- Travel/Exposure Within Last Year


Have you traveled outside the [place] in the last year?: No





- Additonal Travel Details


Have you been exposed to anyone with a communicable illness?: No





- Travel Symptoms


Symptom Screening: None





Review of Systems


Constitutional: Denies: Chills, Fever, Malaise, Night sweats


Eyes: Denies: Eye discharge, Eye pain


ENT: Denies: Congestion, Ear pain, Epistaxis


Respiratory: Denies: Cough, Dyspnea


Cardiovascular: Denies: Chest pain, Dyspnea on exertion


Endocrine: Denies: Fatigue, Heat or cold intolerance


Gastrointestinal: Denies: Abdominal pain, Nausea, Vomiting


Genitourinary: Denies: Incontinence, Retention


Musculoskeletal: Denies: Arthralgia, Back pain, Gout, Joint swelling


Skin: Denies: Bruising, Change in color


Neurological: Denies: Abnormal gait, Confusion, Headache


Psychiatric: Denies: Anxiety





Past Medical History





- SOCIAL HISTORY


Smoking Status: Current every day smoker


Alcohol Use: None


Drug Use: None





- RESPIRATORY


Hx Respiratory Disorders: Yes


Hx COPD: Yes





- CARDIOVASCULAR


Hx Cardio Disorders: No





- NEURO


Hx Neuro Disorders: No





- GI


Hx GI Disorders: Yes


Hx Hiatal Hernia: Yes





- 


Hx Genitourinary Disorders: Yes


Hx Kidney Stones: Yes





- ENDOCRINE


Hx Endocrine Disorders: Yes


Hx Diabetes: No


Hx Thyroid Disease: Yes





- MUSCULOSKELETAL


Hx Musculoskeletal Disorders: Yes


Comment:: Right hip problems  and Scoliosis





- PSYCH


Hx Psych Problems: Yes


Hx Anxiety: Yes


Hx Behavior Problems: Yes


Hx Depression: Yes


Comment:: Bipolar





- HEMATOLOGY/ONCOLOGY


Hx Hematology/Oncology Disorders: No





Family Medical History


Any Significant Family History?: Yes


Hx Alcohol Use: Brother/Sister


*Alcohol Comment: Brother OD on Methadone.


Hx Cancer: Mother


Hx Heart Disease: Brother/Sister





Physical Exam





- General


General Appearance: Alert, Oriented x3, Cooperative, Moderate distress


Limitations: No limitations





- Head


Head exam: Atraumatic, Normocephalic, Normal inspection


Head exam detail: negative: Abrasion, Contusion, Orozco's sign, General 

tenderness, Hematoma, Laceration





- Eye


Eye exam: Normal appearance.  negative: Conjunctival injection, Periorbital 

swelling, Periorbital tenderness, Scleral icterus





- ENT


Ear exam: negative: Auricular hematoma, Auricular trauma


Nasal Exam: negative: Active bleeding, Discharge, Dried blood, Foreign body


Mouth exam: negative: Drooling, Laceration, Muffled voice, Tongue elevation





- Neck


Neck exam: Normal inspection.  negative: Meningismus, Tenderness





- Respiratory


Respiratory exam: Normal lung sounds bilaterally.  negative: Respiratory 

distress, Rhonchi, Stridor, Wheezes





- Cardiovascular


Cardiovascular Exam: Regular rate, Normal rhythm, Normal heart sounds





- GI/Abdominal


GI/Abdominal exam: Soft.  negative: Rebound, Rigid, Tenderness





- Rectal


Rectal exam: Deferred





- 


 exam: Deferred





- Extremities


Extremities exam: Tenderness, Other (TTP to the anterior left hip, internal 

roitation of the LE on examination, stable DPP on examination).  negative: Calf 

tenderness, Pedal edema





- Back


Back exam: Denies: CVA tenderness (R), CVA tenderness (L)





- Neurological


Neurological exam: Alert, Normal gait, Oriented X3





- Psychiatric


Psychiatric exam: Normal affect, Normal mood





- Skin


Skin exam: Normal color.  negative: Abrasion


Type of lesion: negative: abrasion





Course





 Vital Signs











  17





  12:53


 


Temperature 98.1 F


 


Pulse Rate 96 H


 


Respiratory 18





Rate 


 


Blood Pressure 131/84


 


Pulse Ox 95














- Reevaluation(s)


Reevaluation #1: 





17 15:48


Left Hip: Superior dislocation left hip prosthesis





Post-reduction radiographs: Satisfactory reduction of the left hip prosthesis





Patient awake, alert, has ambulated on her lower extremity without difficulty, 

and appears stable for discharge at this time.








Procedures





- Orthopedic Joint Reduction


  ** Joint #1


Consent Obtained: Verbal consent, Written consent


Time Out Performed: Yes


Side: Left


Joint Reduction Location: Hip


Analgesia: None


Shoulder Technique Used (if applicable): Traction/counter-traction


Technique Used: Traction/counter-traction


Post-Reduction Neuro Exam: Intact


Post-Reduction Vascular Exam: Intact


Post Reduction X-Ray Obtained: Yes


Post Reduction X-Ray Results: Reduced


Splint Applied: Yes


Patient Tolerated Procedure: Good





- Procedural Sedation


Indications: fracture/dislocation redu


ASA Class: II


Mallampati Airway Score: 2


Time of Last PO Intake: 08:30


Preparation: cardiac monitor applied, pulse oximeter, capnometry used, 

supplemental O2 applied, suction/airway equipment at bedside, IV secured


Midazolam: IV


Midazolam Dose: 2


IV Etomidate Dose (mgs): 24


Complications: none


Interventions: airway repositioned


Patient Tolerated Procedure: Good


Sedation Start Date: 17


Sedation Start Time: 14:45


Sedation End Date: 17


Sedation End Time: 15:10





Disposition


Disposition: Discharge


Clinical Impression: 


Hip dislocation, left


Qualifiers:


 Encounter type: initial encounter Qualified Code(s): S73.005A - Unspecified 

dislocation of left hip, initial encounter





Disposition: Home, Self-Care


Condition: (2) Stable


Instructions:  Hip Dislocation (ED)


Additional Instructions: 


Return to ED if your symptoms worsen or if you have any concerns.


Knee immobilizer as directed.


Follow=up with Dr. Castillo in 3-5 days as directed.


Forms:  Patient Portal Access


Time of Disposition: 15:03





Quality





- Quality Measures


Quality Measures: N/A





- Blood Pressure Screening


Does Patient Have Any of the Following: No


Blood Pressure Classification: Pre-Hypertensive BP Reading


Systolic Measurement: 131


Diastolic Measurement: 84


Screening for High Blood Pressure: < Pre-Hypertensive BP, F/U Documented > [

]


Pre-Hypertensive Follow-up Interventions: Referral to alternative/primary care 

provider.

## 2018-01-02 NOTE — RADIOLOGY REPORT
EXAM:  HIP,UNILAT, 2-3 VIEW LEFT



HISTORY:  POSTREDUCTION.



TECHNIQUE:  AP pelvis and left hip, two views.



COMPARISON:  Left hip radiograph earlier same day.



FINDINGS:  Interval reduction of left hip dislocation. Appropriate alignment is 
seen. The patient has bilateral hip arthroplasties. No evidence for hardware 
failure. Previous posterior lumbosacral fusion and decompression.



IMPRESSION:  INTERVAL REDUCTION LEFT HIP DISLOCATION.



JOB NUMBER:  [id number]
[facility]

## 2018-01-02 NOTE — RADIOLOGY REPORT
EXAM:  HIP,UNILAT, 2-3 VIEW LEFT



HISTORY:  LEFT HIP  PAIN AND DISLOCATION.



TECHNIQUE:  An AP view of the pelvis and AP and lateral views of the left hip 
were obtained.



COMPARISON:  11/27/2017.



ENCOUNTER:  Initial.



FINDINGS:  The patient is status post bilateral total hip arthroplasty. There 
is superior dislocation of the left femoral component. There is no visible 
associated fracture. The acetabular component appears unchanged. A right hip 
prosthesis is also present and is unchanged. There are postsurgical changes 
within the lumbar spine. The bony pelvis appears intact.



IMPRESSION:  



SUPERIOR DISLOCATION OF THE LEFT HIP PROSTHESIS WITH NO VISIBLE ASSOCIATED 
FRACTURE.



JOB NUMBER:  [id number]
MTDD

## 2018-02-27 NOTE — EMERGENCY DEPARTMENT RECORD
History of Present Illness





- General


Chief complaint: Pain


Stated complaint: HIP PAIN


Time Seen by Provider: 18 20:03


Source: Patient


Mode of Arrival: EMS


Limitations: No limitations





- History of Present Illness


Initial comments: 





51 yo female presents to the ED for evaluation of left hip pain while rolling 

over on the bed this evening.  Patient reports that her hip dislocated, reports 

that she attempted to relocate her hip at home without success.  Patient 

reports 6 dislocations since November, underwent revision in January where a 

"cap" was added to prevent dislocation.  Patient denies numbness, tingling, or 

lower extremity weakness symptoms. 


MD Complaint: Joint pain


Onset/Timin


-: Minutes(s)


Location: Left


History of Same: No


Consistency: Constant


Improves with: Nothing


Worsens with: Nothing


Associated Symptoms: Denies other symptoms





- Related Data


 Home Medications











 Medication  Instructions  Recorded  Confirmed  Last Taken


 


Hydrocodone/Acetaminophen [Norco 1 tab PO Q8H PRN 18 Unknown





10mg/325mg]    


 


Ketorolac Tromethamine 10 mg PO Q8H PRN 18 Unknown











 Allergies











Allergy/AdvReac Type Severity Reaction Status Date / Time


 


cephalexin [From Keflex] AdvReac  DIARRHEA Verified 17 12:59


 


naproxen AdvReac  VOMITING Verified 17 12:59














Travel Screening





- Travel/Exposure Within Last 30 Days


Have you traveled within the last 30 days?: No





Review of Systems


Constitutional: Denies: Chills, Fever, Malaise, Night sweats


Eyes: Denies: Eye discharge, Eye pain


ENT: Denies: Congestion, Ear pain, Epistaxis


Respiratory: Denies: Cough


Cardiovascular: Denies: Chest pain, Dyspnea on exertion


Endocrine: Denies: Fatigue, Heat or cold intolerance


Gastrointestinal: Denies: Abdominal pain, Nausea, Vomiting


Genitourinary: Denies: Incontinence, Retention


Musculoskeletal: Reports: Arthralgia.  Denies: Back pain, Gout, Joint swelling


Skin: Denies: Bruising, Change in color


Neurological: Denies: Confusion, Headache, Seizure


Psychiatric: Denies: Anxiety


Hematological/Lymphatic: Denies: Anemia, Blood Clots





Past Medical History





- SOCIAL HISTORY


Smoking Status: Current every day smoker


Alcohol Use: Occasional


Drug Use: None





- RESPIRATORY


Hx Respiratory Disorders: Yes


Hx COPD: Yes





- CARDIOVASCULAR


Hx Cardio Disorders: No





- NEURO


Hx Neuro Disorders: No





- GI


Hx GI Disorders: Yes


Hx Hiatal Hernia: Yes





- 


Hx Genitourinary Disorders: Yes


Hx Kidney Stones: Yes





- ENDOCRINE


Hx Endocrine Disorders: Yes


Hx Diabetes: No


Hx Thyroid Disease: Yes





- MUSCULOSKELETAL


Hx Musculoskeletal Disorders: Yes


Comment:: Right hip problems  and Scoliosis





- PSYCH


Hx Psych Problems: Yes


Hx Anxiety: Yes


Hx Behavior Problems: Yes


Hx Depression: Yes


Comment:: Bipolar





- HEMATOLOGY/ONCOLOGY


Hx Hematology/Oncology Disorders: No





Family Medical History


Any Significant Family History?: Yes


Hx Alcohol Use: Brother/Sister


*Alcohol Comment: Brother OD on Methadone.


Hx Cancer: Mother


Hx Heart Disease: Brother/Sister





Physical Exam





- General


General Appearance: Alert, Oriented x3, Cooperative, Mild distress


Limitations: No limitations





- Head


Head exam: Atraumatic, Normocephalic, Normal inspection


Head exam detail: negative: Abrasion, Contusion, Orozco's sign, General 

tenderness, Hematoma, Laceration





- Eye


Eye exam: Normal appearance.  negative: Conjunctival injection, Periorbital 

swelling, Periorbital tenderness, Scleral icterus





- ENT


Ear exam: negative: Auricular hematoma, Auricular trauma


Nasal Exam: negative: Active bleeding, Discharge, Dried blood, Foreign body


Mouth exam: negative: Drooling, Laceration, Muffled voice, Tongue elevation





- Neck


Neck exam: Normal inspection.  negative: Meningismus, Tenderness





- Respiratory


Respiratory exam: Normal lung sounds bilaterally.  negative: Rales, Respiratory 

distress, Rhonchi, Stridor





- Cardiovascular


Cardiovascular Exam: Regular rate, Normal rhythm, Normal heart sounds





- GI/Abdominal


GI/Abdominal exam: Soft.  negative: Rebound, Rigid, Tenderness





- Rectal


Rectal exam: Deferred





- 


 exam: Deferred





- Extremities


Extremities exam: Tenderness (TTP over the left lateral hip, there is 

shortending and internal rotation of the lower extremity examination.  Strong 

DPP is present.).  negative: Calf tenderness, Pedal edema





- Back


Back exam: Denies: CVA tenderness (R), CVA tenderness (L)





- Neurological


Neurological exam: Alert, Oriented X3





- Psychiatric


Psychiatric exam: Normal affect, Normal mood





- Skin


Skin exam: Normal color.  negative: Abrasion


Type of lesion: negative: abrasion





Course





 Vital Signs











  18





  19:57


 


Temperature 98.1 F


 


Pulse Rate 78


 


Respiratory 20





Rate 


 


Blood Pressure 118/84


 


Pulse Ox 98














- Reevaluation(s)


Reevaluation #1: 





18 20:57


Left Hip: Findings c/w superior/lateral dislocation





Post-Procedure radiographs ordered-see procedure note for details.


Reevaluation #2: 





18 21:00


Post-reductions films:


Satisfactory reduction of previously dislocated left hip arthroplasty


Reevaluation #3: 





18 21:11


Patient is currently awake, alert, and conversing with staff.  Patient is well 

appearing following conscious sedation.


Reevaluation #4: 





18 21:23


Patient is ambulating with walker with steady gait, appears stable for 

discharge at this time.





Procedures





- Orthopedic Joint Reduction


  ** Joint #1


Consent Obtained: Verbal consent, Written consent


Time Out Performed: Yes


Side: Left


Joint Reduction Location: Hip


Analgesia: None


Technique Used: Traction/counter-traction


Post-Reduction Neuro Exam: Intact


Post-Reduction Vascular Exam: Intact


Post Reduction X-Ray Obtained: Yes


Post Reduction X-Ray Results: Reduced


Splint Applied: No


Patient Tolerated Procedure: Good, No complications





- Procedural Sedation


Indications: fracture/dislocation redu


ASA Class: III


Mallampati Airway Score: 3


Preparation: cardiac monitor applied, pulse oximeter, supplemental O2 applied, 

suction/airway equipment at bedside, IV secured


Midazolam: IV


Midazolam Dose: 2


IV Etomidate Dose (mgs): 12


Complications: none


Patient Tolerated Procedure: Good


Sedation Start Date: 18


Sedation Start Time: 20:50


Sedation End Date: 18


Sedation End Time: 20:55





Disposition


Disposition: Discharge


Clinical Impression: 


Hip dislocation, left


Qualifiers:


 Encounter type: initial encounter Qualified Code(s): S73.005A - Unspecified 

dislocation of left hip, initial encounter





Disposition: Home, Self-Care


Condition: (2) Stable


Instructions:  Hip Dislocation (ED)


Additional Instructions: 


Return to ED if your symptoms worsen or if you have any concerns.


Follow-up with Dr. Castillo in 3-5 days as directed.





Forms:  Patient Portal Access


Time of Disposition: 20:58





Quality





- Quality Measures


Quality Measures: N/A





- Blood Pressure Screening


Does Patient Have Any of the Following: No


Blood Pressure Classification: Pre-Hypertensive BP Reading


Systolic Measurement: 118


Diastolic Measurement: 84


Screening for High Blood Pressure: < Pre-Hypertensive BP, F/U Documented > [

]


Pre-Hypertensive Follow-up Interventions: Referral to alternative/primary care 

provider.

## 2018-03-01 NOTE — RADIOLOGY REPORT
EXAM:  LEFT HIP



HISTORY:  POST REDUCTION EVALUATION LEFT HIP.  



TECHNIQUE:  A single AP view of the left hip was obtained.  



Comparison:  Left hip series from earlier this evening on 2/27/18 at 8:28 p.m.



FINDINGS:  There has been reduction in the previously seen superior dislocation 
of the femoral component of the left MYRA in the single AP projection provided.  
No associated fracture is seen.  Postop changes lumbar spine partially seen.  



The distal extent of the femoral component of the left MYRA not included on this 
single view as well.  



IMPRESSION:  

REDUCTION OF THE PREVIOUSLY SEEN DISLOCATION OF THE FEMORAL COMPONENT OF THE 
LEFT MYRA IN THE AP PROJECTION.  



JOB NUMBER:  [id number]
MTDD

## 2018-05-27 NOTE — EMERGENCY DEPARTMENT RECORD
History of Present Illness





- General


Chief complaint: Pain


Stated complaint: LT HIP DISLOCATION


Time Seen by Provider: 18 20:45


Source: Patient


Mode of Arrival: EMS


Limitations: No limitations





- History of Present Illness


Initial comments: 





53 yo female presents to ED for evaluation of left hip pain just prior to 

arrival.  Patient reports that she tilted to the left to straighten a window air

-conditioner when she felt her hip pop out of place.  Patient reports numerous 

previous dislocations, denies fall or injury to the hip this evening.  Patient 

reports last eating several hours ago.


MD Complaint: Extremity pain


Onset/Timin


-: Minutes(s)


Location: Left


History of Same: Yes


-: Yes Arthralgia


Radiation: Proximal


Quality: Aching


Consistency: Constant


Improves with: Nothing


Worsens with: Nothing


Associated Symptoms: Denies other symptoms





- Related Data


 Previous Rx's











 Medication  Instructions  Recorded


 


Albuterol Sulfate [Proair Hfa] 1 - 2 puff IH .EVERY 4-6 HOURS PRN 18





 #1 inhaler 











 Allergies











Allergy/AdvReac Type Severity Reaction Status Date / Time


 


cephalexin [From Keflex] AdvReac  DIARRHEA Unverified 18 10:23


 


naproxen AdvReac  VOMITING Unverified 18 10:23














Review of Systems


Constitutional: Denies: Chills, Fever, Malaise, Night sweats


Eyes: Denies: Eye discharge, Eye pain


ENT: Denies: Congestion, Ear pain, Epistaxis


Respiratory: Denies: Cough, Dyspnea


Cardiovascular: Denies: Chest pain, Dyspnea on exertion


Endocrine: Denies: Fatigue, Heat or cold intolerance


Gastrointestinal: Denies: Abdominal pain, Nausea, Vomiting


Genitourinary: Denies: Incontinence, Retention


Musculoskeletal: Reports: Arthralgia.  Denies: Back pain, Gout, Joint swelling


Skin: Denies: Bruising, Change in color


Neurological: Denies: Abnormal gait, Confusion, Headache, Seizure


Psychiatric: Denies: Anxiety


Hematological/Lymphatic: Denies: Anemia, Blood Clots





Past Medical History





- SOCIAL HISTORY


Smoking Status: Current every day smoker





- RESPIRATORY


Hx Respiratory Disorders: Yes


Hx COPD: Yes





- CARDIOVASCULAR


Hx Cardio Disorders: No





- NEURO


Hx Neuro Disorders: No





- GI


Hx GI Disorders: Yes


Hx Hiatal Hernia: Yes





- 


Hx Genitourinary Disorders: Yes


Hx Kidney Stones: Yes





- ENDOCRINE


Hx Endocrine Disorders: Yes


Hx Diabetes: No


Hx Thyroid Disease: Yes





- MUSCULOSKELETAL


Hx Musculoskeletal Disorders: Yes


Comment:: Right hip problems  and Scoliosis





- PSYCH


Hx Psych Problems: Yes


Hx Anxiety: Yes


Hx Behavior Problems: Yes


Hx Depression: Yes


Comment:: Bipolar





- HEMATOLOGY/ONCOLOGY


Hx Hematology/Oncology Disorders: No





Family Medical History


Hx Alcohol Use: Brother/Sister


*Alcohol Comment: Brother OD on Methadone.


Hx Cancer: Mother


Hx Heart Disease: Brother/Sister





Physical Exam





- General


General Appearance: Alert, Oriented x3, Cooperative, Moderate distress


Limitations: No limitations





- Head


Head exam: Atraumatic, Normocephalic, Normal inspection


Head exam detail: negative: Abrasion, Contusion, Orozco's sign, General 

tenderness, Hematoma, Laceration





- Eye


Eye exam: Normal appearance.  negative: Conjunctival injection, Periorbital 

swelling, Periorbital tenderness, Scleral icterus





- ENT


Ear exam: negative: Auricular hematoma, Auricular trauma


Nasal Exam: negative: Active bleeding, Discharge, Dried blood, Foreign body


Mouth exam: negative: Drooling, Laceration, Muffled voice, Tongue elevation





- Neck


Neck exam: Normal inspection.  negative: Meningismus, Tenderness





- Respiratory


Respiratory exam: Normal lung sounds bilaterally.  negative: Rales, Respiratory 

distress, Rhonchi, Stridor





- Cardiovascular


Cardiovascular Exam: Regular rate, Normal rhythm, Normal heart sounds


Peripheral Pulses: 3+: Dorsalis Pedis (L)





- GI/Abdominal


GI/Abdominal exam: Soft.  negative: Rebound, Rigid, Tenderness





- Rectal


Rectal exam: Deferred





- 


 exam: Deferred





- Extremities


Extremities exam: Tenderness (TTP over the proximal left hip on examination).  

negative: Calf tenderness, Pedal edema





- Back


Back exam: Denies: CVA tenderness (R), CVA tenderness (L)





- Neurological


Neurological exam: Alert, Oriented X3





- Psychiatric


Psychiatric exam: Normal affect, Normal mood





- Skin


Skin exam: Abrasion


Type of lesion: negative: abrasion





Course





- Reevaluation(s)


Reevaluation #1: 





18 21:29


Portable hip radiographs were reviewed, appears c/w with anterior/superior 

dislocation.


Will sedate with Versed and Etomdiate for reduction following explanation of 

all risks and benefits as well as patient consent.


Reevaluation #2: 





18 22:31


Post-procedural radiograph was obtained, hip is not reduced on re-examination.  

Will initiate transfer for orthopedic evaluation.


Reevaluation #3: 





18 22:56


Patient was questioned about where she would prefer to be transferred, patient 

reprots that she has some "legal things" that she needs to take care of before 

going to [place] where her Orthopedist is through.  





Following discussion with the patient regarding transfer for orthopedic 

evaluation, patient reports that she wants to leave AMA at this time.  Risks of 

death, permanent impairment, and worsening of her current condition were 

discussed as well as the benefit of transfer for further orthopedic evaluation 

of her presenting symptoms.  Patient verbalizes understanding of all risks and 

benefits, desires to leave AMA despite these risks.  Based on my examination, 

the patient is alert, oriented, and answers all questions appropriately.  

Patient appears to have the capacity to make rational decisions based on my 

examination.  Patients family (AYLIN Mena) was present for the duration of our 

discussion as well.  Patient was encouraged to return to the ED immediately if 

she changes her mind about treatment and wants to be re-evaluated.  








Reevaluation #4: 





18 23:42


After numerous discussions with the patient, offering to call her orthopedist (

patient spoke with on-call orthopedist herself), patient still wants to leave 

AMA.  Patient is waiting for her son to arrive for discharge.  Will provide 

crutches for the patient as she is still refusing transfer to [facility] for 

orthopedic evaluation.


Reevaluation #5: 





18 00:26


Patient reassessed, on her mobile phone, awaiting arrival of her son to take 

her home AMA.  Patient is resting comfortably at this time.





18 01:14


Patient's son and SO are now present to assist the patient getting home.  

Discharge and AMA instructions were re-iterated to the patient, encouraged to 

return for re-evaluation and likely transfer to [place] for orthopedic evaluation.

  Patient verbalizes understanding of all instructions and appears stable for 

discharge at this time.





Procedures





- Orthopedic Joint Reduction


  ** Joint #1


Consent Obtained: Verbal consent


Time Out Performed: Yes


Side: Left


Joint Reduction Location: Hip


Analgesia: None


Shoulder Technique Used (if applicable): Traction/counter-traction


Technique Used: Direct manipulation


Post-Reduction Neuro Exam: Intact


Post-Reduction Vascular Exam: Intact


Post Reduction X-Ray Obtained: Yes


Post Reduction X-Ray Results: Not reduced


Splint Applied: No


Patient Tolerated Procedure: Good





- Procedural Sedation


Indications: fracture/dislocation redu


ASA Class: II


Mallampati Airway Score: 2


Preparation: cardiac monitor applied, pulse oximeter, supplemental O2 applied, 

suction/airway equipment at bedside, IV secured


Midazolam: IV


Midazolam Dose: 2


IV Etomidate Dose (mgs): 60


Complications: none


Patient Tolerated Procedure: Good


Sedation Start Date: 18


Sedation Start Time: 21:43


Sedation End Date: 18


Sedation End Time: 21:52





Disposition


Disposition: Discharge


Clinical Impression: 


Hip dislocation, left


Qualifiers:


 Encounter type: initial encounter Qualified Code(s): S73.005A - Unspecified 

dislocation of left hip, initial encounter





Disposition: Against Medical Advice


Condition: (2) Stable


Instructions:  Hip Dislocation (ED)


Additional Instructions: 


Return to ED if your symptoms worsen or if you have any concerns.   


Follow-up Dr. Castillo as soon as possible for evaluation of your hip dislocation.


Forms:  Patient Portal Access


Time of Disposition: 01:16





Quality





- Quality Measures


Quality Measures: N/A





- Blood Pressure Screening


Does Patient Have Any of the Following: No


Blood Pressure Classification: Hypertensive Reading


Systolic Measurement: 134


Diastolic Measurement: 102


Screening for High Blood Pressure: < Second Hypertensive BP, F/U Documented > [

]


Second Hypertensive Follow-up Interventions: Referral to alternative/primary 

care provider.

## 2018-05-29 NOTE — RADIOLOGY REPORT
EXAM:  LEFT HIP



HISTORY:  LEFT HIP DISLOCATION.  



TECHNIQUE:  Two views of the left hip were obtained.  



Comparison:  None.  



FINDINGS:  The patient has a left hip prosthesis.  The femoral component of the 
prosthesis is dislocated superiorly from the acetabular component.  No fracture 
visualized.  Post surgical changes are seen in the lower lumbar spine.  



IMPRESSION:  

SUPERIOR DISLOCATION OF THE FEMORAL COMPONENT OF THE PATIENT'S LEFT HP 
PROSTHESIS.  



JOB NUMBER:  [id number]
MTDD

## 2018-05-29 NOTE — RADIOLOGY REPORT
EXAM:  LEFT HIP



HISTORY:  POST REDUCTION ATTEMPT.  



TECHNIQUE:  A single view of the left hip was obtained.  



Comparison:  5/27/18.  



FINDINGS:  The left hip prosthesis is again identified.  The femoral component 
of the prosthesis is dislocated superiorly.  No fracture is seen.  



IMPRESSION:  

THE FEMORAL COMPONENT OF THE HIP PROSTHESIS REMAINS DISLOCATED SUPERIORLY.  



JOB NUMBER:  [id number]
MTDD

## 2018-06-12 NOTE — EMERGENCY DEPARTMENT RECORD
History of Present Illness





- General


Chief complaint: Rash


Stated complaint: RASH


Time Seen by Provider: 18 09:49


Source: Patient


Mode of Arrival: Ambulatory


Limitations: No limitations





- History of Present Illness


Initial comments: 


53 yo female presents with several days of an itch rash.  The rash seems to 

move to different parts of the body.  It is extremely itchy.  No fevers or 

chills.  No nausea or vomiting.  No diarrhea.  It has been on the extremities, 

chest and back.  She is unaware of any allergens that she was exposed to 

recently.  No bruising.  No warmth.  Her hip is currently not dislocated.





MD complaint: Rash


Onset/Timin


-: Days(s)


Hx Tetanus Toxoid Vaccination: Yes


Year of Tetanus Vaccination: 


Location: Generalized


Quality: Other (itches)


Improves with: None


Worsens with: None


Context: Other


Associated symptoms: Itching


Treatments Prior to Arrival: Benadryl, OTC topical medication





- Related Data


 Previous Rx's











 Medication  Instructions  Recorded


 


Albuterol Sulfate [Proair Hfa] 1 - 2 puff IH .EVERY 4-6 HOURS PRN 18





 #1 inhaler 


 


Methylprednisolone [Medrol Dose 0 mg PO UD #1 tab.ds.pk 18





Pack]  











 Allergies











Allergy/AdvReac Type Severity Reaction Status Date / Time


 


cephalexin [From Keflex] AdvReac  DIARRHEA Unverified 18 10:23


 


naproxen AdvReac  VOMITING Unverified 18 10:23














Travel Screening





- Travel/Exposure Within Last 30 Days


Have you traveled within the last 30 days?: No





- Travel/Exposure Within Last Year


Have you traveled outside the [place] in the last year?: No





- Additonal Travel Details


Have you been exposed to anyone with a communicable illness?: No





Review of Systems


Constitutional: Denies: Chills, Fever, Malaise, Weakness


Eyes: Denies: Eye discharge


ENT: Denies: Congestion, Throat pain


Respiratory: Denies: Cough


Cardiovascular: Denies: Chest pain, Syncope


Endocrine: Denies: Fatigue, Polydipsia, Polyuria


Gastrointestinal: Denies: Diarrhea, Nausea, Vomiting


Genitourinary: Denies: Dysuria, Urgency


Musculoskeletal: Reports: Arthralgia (chronic hip).  Denies: Back pain, Joint 

swelling, Myalgia


Skin: Reports: As per HPI, Change in color, Pruritus, Rash.  Denies: Bruising


Neurological: Denies: Headache, Numbness, Vertigo, Weakness


Psychiatric: Denies: Anxiety


Hematological/Lymphatic: Denies: Easy bleeding, Easy bruising





Past Medical History





- SOCIAL HISTORY


Smoking Status: Current every day smoker


Alcohol Use: None


Drug Use: Rare


Drug Use Detail:: Marijuana





- RESPIRATORY


Hx Respiratory Disorders: Yes


Hx COPD: Yes





- CARDIOVASCULAR


Hx Cardio Disorders: No





- NEURO


Hx Neuro Disorders: No





- GI


Hx GI Disorders: Yes


Hx Hiatal Hernia: Yes





- 


Hx Genitourinary Disorders: Yes


Hx Kidney Stones: Yes





- ENDOCRINE


Hx Endocrine Disorders: Yes


Hx Diabetes: No





- MUSCULOSKELETAL


Hx Musculoskeletal Disorders: Yes


Comment:: Right hip problems  and Scoliosis





- PSYCH


Hx Psych Problems: Yes


Comment:: Bipolar





- HEMATOLOGY/ONCOLOGY


Hx Hematology/Oncology Disorders: No





Family Medical History


Any Significant Family History?: No


Hx Cancer: Mother





Physical Exam





- General


General Appearance: Alert, Oriented x3, Cooperative, No acute distress


Limitations: No limitations





- Head


Head exam: Atraumatic, Normal inspection





- Eye


Eye exam: Normal appearance, PERRL.  negative: Conjunctival injection, Scleral 

icterus





- ENT


ENT exam: Mucous membranes moist, Normal orophraynx.  negative: Mucous 

membranes dry


Ear exam: negative: Normal external inspection (right ear with hives)


Nasal Exam: Normal inspection


Mouth exam: Normal external inspection


Teeth exam: Normal inspection


Throat exam: Normal inspection





- Neck


Neck exam: Full ROM.  negative: Normal inspection (few scattered hives to back)

, Tenderness





- Respiratory


Respiratory exam: Normal lung sounds bilaterally.  negative: Respiratory 

distress, Rhonchi, Stridor, Wheezes





- Cardiovascular


Cardiovascular Exam: Regular rate, Normal rhythm, Normal heart sounds





- GI/Abdominal


GI/Abdominal exam: Soft.  negative: Tenderness





- Rectal


Rectal exam: Deferred





- 


 exam: Deferred





- Extremities


Extremities exam: negative: Normal inspection (erythema hives to bilateral 

wrist and legs)





- Back


Back exam: Reports: Normal inspection (few scattered hives).  Denies: CVA 

tenderness (R), CVA tenderness (L)





- Neurological


Neurological exam: Alert, Oriented X3





- Psychiatric


Psychiatric exam: Normal affect, Normal mood





- Skin


Skin exam: Dry, Intact, Urticaria, Warm.  negative: Erythema, Mottled





Course





 Vital Signs











  18





  09:49


 


Temperature 97.6 F


 


Pulse Rate 98 H


 


Respiratory 20





Rate 


 


Blood Pressure 126/89


 


Pulse Ox 99














- Reevaluation(s)


Reevaluation #1: 





18 09:59


The rash at this time is consistent allergic in etiology


She was provided a prescription for Medrol dose pack





Disposition


Disposition: Discharge


Clinical Impression: 


 Rash, Urticaria





Disposition: Home, Self-Care


Condition: (1) Good


Instructions:  Urticaria (ED)


Additional Instructions: 


Call your doctor for close follow up your itchy rash


Return or be seen if you have fever, bruising, bleeding, pus or any new concerns


You may take Benadryl as directed for itching


Prescriptions: 


Methylprednisolone [Medrol Dose Pack] 0 mg PO UD #1 tab.ds.pk


Time of Disposition: 10:01





Quality





- Quality Measures


Quality Measures: N/A





- Blood Pressure Screening


Does Patient Have Any of the Following: No


Blood Pressure Classification: Pre-Hypertensive BP Reading


Systolic Measurement: 126


Diastolic Measurement: 89


Screening for High Blood Pressure: < Pre-Hypertensive BP, F/U Documented > [

]


Pre-Hypertensive Follow-up Interventions: Referral to alternative/primary care 

provider.

## 2018-06-25 NOTE — EMERGENCY DEPARTMENT RECORD
History of Present Illness





- General


Stated Complaint: DISLOCATED LT HIP


Time Seen by Provider: 18 22:29


Source: Patient, EMS


Mode of Arrival: EMS


Limitations: No limitations





- History of Present Illness


Initial Comments: 





51 yo female presents to ED for evaluation of recurrent dislocation of the left 

hip while standing in line at [facility] this evening.  Patient reports 

numerous previous dislocations and reductions with similar symptoms.  Patient 

reports that her MYRA surgery as performed in November of last year, underwent 

revision earlier this year for recurrent dislocations.  Patient reports pain to 

the proximal hip, denies numbness, tingling, or weakness symptoms.


MD Complaint: Other (Hip dislocation)


Onset/Timin


-: Minutes(s)


Injury: Hip: Left


Type of Injury: Other


Place: Other


Severity: Moderate


Improves With: Nothing


Worsens With: Nothing


Context: Other (Standing at [facility])


Associated Symptoms: Unable to bear weight





- Related Data


 Previous Rx's











 Medication  Instructions  Recorded


 


Albuterol Sulfate [Proair Hfa] 1 - 2 puff IH .EVERY 4-6 HOURS PRN 18





 #1 inhaler 


 


Methylprednisolone [Medrol Dose 0 mg PO UD #1 tab.ds.pk 18





Pack]  











 Allergies











Allergy/AdvReac Type Severity Reaction Status Date / Time


 


cephalexin [From Keflex] AdvReac  DIARRHEA Verified 18 22:39


 


naproxen AdvReac  VOMITING Verified 18 22:39














Review of Systems


Constitutional: Denies: Chills, Fever, Malaise, Night sweats


Eyes: Denies: Eye discharge, Eye pain


ENT: Denies: Congestion, Ear pain, Epistaxis


Respiratory: Denies: Cough, Dyspnea


Cardiovascular: Denies: Chest pain, Dyspnea on exertion


Endocrine: Denies: Fatigue, Heat or cold intolerance


Gastrointestinal: Denies: Abdominal pain, Nausea, Vomiting


Genitourinary: Denies: Incontinence, Retention


Musculoskeletal: Reports: Arthralgia (Left hip joint pain).  Denies: Back pain, 

Myalgia, Other


Skin: Denies: Bruising, Change in color


Neurological: Denies: Abnormal gait, Confusion, Headache, Seizure


Psychiatric: Denies: Anxiety


Hematological/Lymphatic: Denies: Anemia, Blood Clots





Past Medical History





- SOCIAL HISTORY


Smoking Status: Current every day smoker


Drug Use: Rare


Drug Use Detail:: Marijuana





- RESPIRATORY


Hx Respiratory Disorders: Yes


Hx COPD: Yes





- CARDIOVASCULAR


Hx Cardio Disorders: No





- NEURO


Hx Neuro Disorders: No





- GI


Hx GI Disorders: Yes


Hx Hiatal Hernia: Yes





- 


Hx Genitourinary Disorders: Yes


Hx Kidney Stones: Yes





- ENDOCRINE


Hx Endocrine Disorders: Yes


Hx Diabetes: No





- MUSCULOSKELETAL


Hx Musculoskeletal Disorders: Yes


Comment:: Right hip problems  and Scoliosis





- PSYCH


Hx Psych Problems: Yes


Comment:: Bipolar





- HEMATOLOGY/ONCOLOGY


Hx Hematology/Oncology Disorders: No





Family Medical History


Hx Cancer: Mother





Physical Exam





- General


General Appearance: Alert, Oriented x3, Cooperative, Moderate distress


Limitations: No limitations





- Head


Head exam: Atraumatic, Normocephalic, Normal inspection


Head exam detail: negative: Abrasion, Contusion, Orozco's sign, General 

tenderness, Hematoma, Laceration





- Eye


Eye exam: Normal appearance.  negative: Conjunctival injection, Periorbital 

swelling, Periorbital tenderness, Scleral icterus





- ENT


Ear exam: negative: Auricular hematoma, Auricular trauma


Nasal Exam: negative: Active bleeding, Discharge, Dried blood, Foreign body


Mouth exam: negative: Drooling, Laceration, Muffled voice, Tongue elevation





- Neck


Neck exam: Normal inspection.  negative: Meningismus, Tenderness





- Respiratory


Respiratory exam: Normal lung sounds bilaterally.  negative: Rales, Respiratory 

distress, Rhonchi, Stridor





- Cardiovascular


Cardiovascular Exam: Regular rate, Normal rhythm, Normal heart sounds


Peripheral Pulses: 3+: Dorsalis Pedis (L)





- GI/Abdominal


GI/Abdominal exam: Soft.  negative: Rebound, Rigid, Tenderness





- Rectal


Rectal exam: Deferred





- 


 exam: Deferred





- Extremities


Extremities exam: Tenderness, Other (TTP to the proximal hip with mild 

shortening/external rotation).  negative: Calf tenderness, Pedal edema





- Back


Back exam: Denies: CVA tenderness (R), CVA tenderness (L)





- Neurological


Neurological exam: Alert, Oriented X3.  negative: Motor sensory deficit





- Psychiatric


Psychiatric exam: Normal affect, Normal mood





- Skin


Skin exam: Normal color.  negative: Abrasion


Type of lesion: negative: abrasion





Course





- Reevaluation(s)


Reevaluation #1: 





18 21:40


Left hip:


Anterior/Superior dislocation of the left proximal femur.





Patient was updated on her radiograph findings, consent obtained for sedation 

and reduction.





Reevaluation #2: 





18 22:14


Post-reduction left hip:


Satisfactory reduction of the left hip





Patient is waking from sedation at this time.  SO updated on post-reduction 

status.


Reevaluation #3: 





18 22:40


Patient ambulated to bathroom with steady gait, she is awake, alert, and well 

appearing at this time.


Patient appears stable for discharge at this time.








Procedures





- Orthopedic Joint Reduction


  ** Joint #1


Consent Obtained: Verbal consent, Written consent


Time Out Performed: Yes


Side: Left


Joint Reduction Location: Hip


Analgesia: None


Technique Used: Traction/counter-traction


Post-Reduction Neuro Exam: Intact


Post-Reduction Vascular Exam: Intact


Post Reduction X-Ray Obtained: Yes


Post Reduction X-Ray Results: Reduced


Splint Applied: Yes


Patient Tolerated Procedure: Good





- Procedural Sedation


Indications: fracture/dislocation redu


ASA Class: II


Mallampati Airway Score: 2


Preparation: cardiac monitor applied, pulse oximeter, supplemental O2 applied, 

suction/airway equipment at bedside, IV secured


Midazolam: IV


Midazolam Dose: 2


IV Etomidate Dose (mgs): 16


Complications: none


Patient Tolerated Procedure: Good, No complications


Sedation Start Date: 18


Sedation Start Time: 22:00


Sedation End Date: 18


Sedation End Time: 22:13





Disposition


Disposition: Discharge


Clinical Impression: 


Hip dislocation, left


Qualifiers:


 Encounter type: initial encounter Qualified Code(s): S73.005A - Unspecified 

dislocation of left hip, initial encounter





Disposition: Home, Self-Care


Condition: (2) Stable


Instructions:  Hip Dislocation (ED)


Additional Instructions: 


Return to ED if your symptoms worsen or if you have any concerns.


Follow-up with your family doctor in 3-5 days as directed.


Forms:  Patient Portal Access


Time of Disposition: 22:46





Quality





- Quality Measures


Quality Measures: N/A





- Blood Pressure Screening


Does Patient Have Any of the Following: No


Blood Pressure Classification: Pre-Hypertensive BP Reading


Systolic Measurement: 112


Diastolic Measurement: 83


Screening for High Blood Pressure: < Pre-Hypertensive BP, F/U Documented > [

]


Pre-Hypertensive Follow-up Interventions: Referral to alternative/primary care 

provider.

## 2018-06-28 NOTE — RADIOLOGY REPORT
EXAM:  HIP,UNILAT, 2-3 VIEW LEFT



HISTORY:  DISLOCATION.



TECHNIQUE:  Three views of the left hip.



COMPARISON:  Prior left hip from 05/27/18.



FINDINGS:  Superior dislocation of the femoral component of the left hip. The 
patient is status post total left hip arthroplasty. No discrete fracture. 
Osteopenia. 



IMPRESSION:  



SUPERIOR LEFT HIP DISLOCATION. NO DISCRETE FRACTURE.



JOB NUMBER:  [id number]
MTDD

## 2018-06-28 NOTE — RADIOLOGY REPORT
EXAM:  HIP,UNILAT, 1 VIEW LEFT



HISTORY:  POST REDUCTION.



TECHNIQUE:  Single AP view of the left hip.



COMPARISON:  Prior images from today's date.



FINDINGS:  Interval reduction of the previously noted left hip dislocation. No 
discrete fracture. 



IMPRESSION:  



INTERVAL REDUCTION WITH ANATOMIC ALIGNMENT.



JOB NUMBER:  [id number]
MTDD

## 2018-07-11 NOTE — EMERGENCY DEPARTMENT RECORD
History of Present Illness





- General


Chief complaint: Swelling of legs


Stated complaint: BOTH LEG SWELLING


Time Seen by Provider: 07/11/18 15:15


Source: Patient, RN notes reviewed


Mode of Arrival: Ambulatory





- History of Present Illness


Initial comments: 


redness in both lower legs she has had some swelling of legs and redness 

bilaterally and no leg pain and it is getting betting because of ace wraps and 

this stated 3 days ago.





Onset/Timing: 3


-: Days(s)





- Related Data


 Previous Rx's











 Medication  Instructions  Recorded


 


Albuterol Sulfate [Proair Hfa] 1 - 2 puff IH .EVERY 4-6 HOURS PRN 04/09/18





 #1 inhaler 


 


Clindamycin HCl 300 mg PO TID #30 capsule 07/11/18











 Allergies











Allergy/AdvReac Type Severity Reaction Status Date / Time


 


cephalexin [From Keflex] AdvReac  DIARRHEA Verified 06/25/18 22:39


 


naproxen AdvReac  VOMITING Verified 06/25/18 22:39














Travel Screening





- Travel/Exposure Within Last 30 Days


Have you traveled within the last 30 days?: No





- Travel/Exposure Within Last Year


Have you traveled outside the [place] in the last year?: No





- Additonal Travel Details


Have you been exposed to anyone with a communicable illness?: No





- Travel Symptoms


Symptom Screening: None





Review of Systems


Reviewed: No additional complaints except as noted below


Constitutional: Reports: As per HPI.  Denies: Chills, Fever, Malaise, Night 

sweats, Weakness, Weight change


Eyes: Reports: As per HPI.  Denies: Eye discharge, Eye pain, Photophobia, 

Vision change


ENT: Reports: As per HPI.  Denies: Congestion, Dental pain, Ear pain, Epistaxis

, Hearing loss, Throat pain


Respiratory: Reports: As per HPI.  Denies: Cough, Dyspnea, Hemoptysis, Stridor, 

Wheezes


Cardiovascular: Reports: As per HPI.  Denies: Arrhythmia, Chest pain, Dyspnea 

on exertion, Edema, Murmurs, Orthopnea, Palpitations, Paroxysmal nocturnal 

dyspnea, Rheumatic Fever, Syncope


Endocrine: Reports: As per HPI.  Denies: Fatigue, Heat or cold intolerance, 

Polydipsia, Polyuria


Gastrointestinal: Reports: As per HPI.  Denies: Abdominal pain, Constipation, 

Diarrhea, Hematemesis, Hematochezia, Melena, Nausea, Vomiting


Genitourinary: Reports: As per HPI.  Denies: Abnormal menses, Discharge, 

Dyspareunia, Dysuria, Frequency, Hematuria, Incontinence, Retention, Urgency


Musculoskeletal: Reports: As per HPI.  Denies: Arthralgia, Back pain, Gout, 

Joint swelling, Myalgia, Neck pain


Skin: Reports: As per HPI, Rash.  Denies: Bruising, Change in color, Change in 

hair/nails, Lesions, Pruritus


Neurological: Reports: As per HPI.  Denies: Abnormal gait, Confusion, Headache, 

Numbness, Paresthesias, Seizure, Tingling, Tremors, Vertigo, Weakness


Psychiatric: Reports: As per HPI.  Denies: Anxiety, Auditory hallucinations, 

Depression, Homicidal thoughts, Suicidal thoughts, Visual hallucinations


Hematological/Lymphatic: Reports: As per HPI.  Denies: Anemia, Blood Clots, 

Easy bleeding, Easy bruising, Swollen glands





Past Medical History





- SOCIAL HISTORY


Smoking Status: Current every day smoker


Alcohol Use: None


Drug Use: None





- RESPIRATORY


Hx Respiratory Disorders: Yes


Hx COPD: Yes





- CARDIOVASCULAR


Hx Cardio Disorders: No


Hx Edema: Yes (from hip)





- NEURO


Hx Neuro Disorders: No


Hx of Migraines: Yes





- GI


Hx GI Disorders: Yes


Hx Hiatal Hernia: Yes





- 


Hx Genitourinary Disorders: Yes


Hx Kidney Stones: Yes





- ENDOCRINE


Hx Endocrine Disorders: Yes


Hx Diabetes: No





- MUSCULOSKELETAL


Hx Musculoskeletal Disorders: Yes


Comment:: Right hip problems  and Scoliosis





- PSYCH


Hx Psych Problems: Yes


Comment:: Bipolar





- HEMATOLOGY/ONCOLOGY


Hx Hematology/Oncology Disorders: No





Family Medical History


Any Significant Family History?: Yes


Family Hx Comment (NOT TO BE USED IN PLACE OF ITEMS BELOW): "don't really know 

too much about family history"


*Alcohol Comment: Brother OD on Methadone.


Hx Cancer: Mother





Physical Exam





- General


General Appearance: Alert, Oriented x3, Cooperative, No acute distress





- Head


Head exam: Normal inspection





- Eye


Eye exam: Normal appearance, PERRL


Pupils: Normal accommodation





- ENT


ENT exam: Normal exam, Mucous membranes moist, Normal external ear exam, Normal 

orophraynx, TM's normal bilaterally


Ear exam: Normal external inspection.  negative: External canal tenderness


Nasal Exam: Normal inspection.  negative: Discharge, Sinus tenderness


Mouth exam: Normal external inspection, Tongue normal


Teeth exam: Normal inspection.  negative: Dental caries


Throat exam: Normal inspection.  negative: Tonsillar erythema, Tonsillar exudate





- Neck


Neck exam: Normal inspection, Full ROM.  negative: Tenderness





- Respiratory


Respiratory exam: Normal lung sounds bilaterally.  negative: Respiratory 

distress





- Cardiovascular


Cardiovascular Exam: Regular rate, Normal rhythm, Normal heart sounds





- GI/Abdominal


GI/Abdominal exam: Soft, Normal bowel sounds.  negative: Tenderness





- Rectal


Rectal exam: Deferred





- 


 exam: Deferred





- Extremities


Extremities exam: Normal inspection, Full ROM, Normal capillary refill.  

negative: Tenderness





- Back


Back exam: Reports: Normal inspection, Full ROM.  Denies: Muscle spasm, Rash 

noted, Tenderness





- Neurological


Neurological exam: Alert, Normal gait, Oriented X3, Reflexes normal





- Psychiatric


Psychiatric exam: Normal affect, Normal mood





- Skin


Skin exam: Dry, Intact, Normal color, Rash, Warm





Course





 Vital Signs











  07/11/18





  14:44


 


Temperature 97.5 F L


 


Pulse Rate 83


 


Respiratory 16





Rate 


 


Blood Pressure 126/92


 


Pulse Ox 97














Disposition


Clinical Impression: 


 Pedal edema





Cellulitis


Qualifiers:


 Site of cellulitis: extremity Site of cellulitis of extremity: lower extremity 

Laterality: unspecified laterality Qualified Code(s): L03.119 - Cellulitis of 

unspecified part of limb





Disposition: Home, Self-Care


Condition: (1) Good


Instructions:  Leg Edema (ED), Stasis Dermatitis (ED), Cellulitis (ED)


Additional Instructions: 


follow up with family Dr in 5 days.


Prescriptions: 


Clindamycin HCl 300 mg PO TID #30 capsule


Time of Disposition: 15:37





Quality





- Quality Measures


Quality Measures: N/A





- Blood Pressure Screening


Does Patient Have Any of the Following: No


Blood Pressure Classification: Hypertensive Reading


Systolic Measurement: 126


Diastolic Measurement: 92


Screening for High Blood Pressure: < Pre-Hypertensive BP, F/U Documented > [

]


Pre-Hypertensive Follow-up Interventions: Referral to alternative/primary care 

provider.

## 2018-09-19 NOTE — EMERGENCY DEPARTMENT RECORD
History of Present Illness





- General


Chief Complaint: Abdominal Pain


Stated Complaint: ABDOMINAL PAIN


Time Seen by Provider: 09/19/18 14:13


Source: Patient


Mode of Arrival: Ambulatory


Limitations: No limitations





- History of Present Illness


Initial Comments: 


The patient is here due to developing AP for the last 3 days over her ventral 

hernia site. She did have nausea, vomiting and diarrhea prior to the onset. The 

vomiting was 3 days ago and she has been eating normally for 2 days. There has 

been no fever, chills, dysuria, CP, SOB, or HA's. The patient does have chronic 

back and hip pain but that has not been any worse than normal. Additionally the

  patient is out of her Sheldon and Tramadol.





MD Complaint: Abdominal pain


Onset/Timing: 3


-: Year(s)


Location: Periumbilical


Severity: Moderate


Severity scale (1-10): 8


Quality: Sharp


Consistency: Constant


Improves With: Rest


Worsens With: Movement


Associated Symptoms: Denies other symptoms





- Related Data


Patient Pregnant: No


 Allergies











Allergy/AdvReac Type Severity Reaction Status Date / Time


 


cephalexin [From Keflex] AdvReac  DIARRHEA Verified 09/19/18 14:03


 


naproxen AdvReac  VOMITING Verified 09/19/18 14:03














Travel Screening





- Travel/Exposure Within Last 30 Days


Have you traveled within the last 30 days?: No





- Travel/Exposure Within Last Year


Have you traveled outside the [place] in the last year?: No





- Additonal Travel Details


Have you been exposed to anyone with a communicable illness?: No





- Travel Symptoms


Symptom Screening: None





Review of Systems


Constitutional: Denies: Chills, Fever


Eyes: Denies: Eye discharge


ENT: Denies: Congestion


Respiratory: Denies: Cough, Dyspnea





Past Medical History





- SOCIAL HISTORY


Smoking Status: Current every day smoker


Alcohol Use: Rare


Drug Use: None





- RESPIRATORY


Hx Respiratory Disorders: Yes


Hx COPD: Yes





- CARDIOVASCULAR


Hx Cardio Disorders: Yes


Hx Edema: Yes (from hip)





- NEURO


Hx Neuro Disorders: Yes


Hx of Migraines: Yes





- GI


Hx GI Disorders: Yes


Hx Hiatal Hernia: Yes





- 


Hx Genitourinary Disorders: Yes


Hx Kidney Stones: Yes





- ENDOCRINE


Hx Endocrine Disorders: Yes


Hx Diabetes: No


Hx Thyroid Disease: Yes





- MUSCULOSKELETAL


Hx Musculoskeletal Disorders: Yes


Comment:: Right hip problems  and Scoliosis





- PSYCH


Hx Psych Problems: Yes


Comment:: Bipolar





- HEMATOLOGY/ONCOLOGY


Hx Hematology/Oncology Disorders: No





Family Medical History


Any Significant Family History?: Yes


Family Hx Comment (NOT TO BE USED IN PLACE OF ITEMS BELOW): "don't really know 

too much about family history"


*Alcohol Comment: Brother OD on Methadone.


Hx Cancer: Mother





Physical Exam





- General


General Appearance: Alert, Oriented x3, Cooperative, No acute distress





- Head


Head exam: Atraumatic, Normocephalic, Normal inspection





- Eye


Eye exam: Normal appearance, PERRL





- Neck


Neck exam: Normal inspection, Full ROM.  negative: Tenderness





- Respiratory


Respiratory exam: Normal lung sounds bilaterally.  negative: Respiratory 

distress





- Cardiovascular


Cardiovascular Exam: Regular rate, Normal rhythm, Normal heart sounds





- GI/Abdominal


GI/Abdominal exam: Soft, Normal bowel sounds, Tenderness (There is mild 

tenderness over the mid abdomen over the presumed ventral hernia.).  negative: 

Distended, Guarding, Rebound, Rigid





- Extremities


Extremities exam: Normal inspection, Full ROM, Normal capillary refill.  

negative: Tenderness





Course





 Vital Signs











  09/19/18





  14:12


 


Temperature 97.6 F


 


Pulse Rate 84


 


Respiratory 20





Rate 


 


Blood Pressure 141/95


 


Pulse Ox 96














- Reevaluation(s)


Reevaluation #1: 


I did have a long discussion with the patient regarding her AP and the fact she 

is out of her pain meds. I did tell her that I would be happy to FULLY evaluate 

her AP including possibly ordering a CT scan but will not be willing to refill 

her chronic pain meds. After we discussed that fact the patient did get up and 

wanted to leave and did refuse any further evaluation. I explained to her that 

I have not done a complete evaluation and that by leaving she would have to 

leave AMA. The risks of leaving are that the patient could have an intra-

abdominal infection, incarcerated hernia, need surgery, have an infection and 

even die. The patient fully understands and accepts the risks. I explained to 

her that we could NOT be held liable for NOT evaluating her further due to the 

fact the patient refused her evaluation.


09/19/18 14:27








Disposition


Disposition: Discharge


Clinical Impression: 


Ventral hernia


Qualifiers:


 Obstruction and gangrene presence: without obstruction or gangrene Qualified 

Code(s): K43.9 - Ventral hernia without obstruction or gangrene





Disposition: Against Medical Advice


Condition: (2) Stable


Instructions:  Abdominal Pain (ED)


Additional Instructions: 


Please see your family doctor ASAP and return to the ER for any problems.


Forms:  Patient Portal Access


Time of Disposition: 14:20





Quality





- Quality Measures


Quality Measures: N/A





- Blood Pressure Screening


View Details: Yes


Does Patient Have Any of the Following: No


Blood Pressure Classification: Hypertensive Reading


Systolic Measurement: 141


Diastolic Measurement: 95


Screening for High Blood Pressure: < First Hypertensive BP, F/U Documented > [

]


First Hypertensive Follow-up Interventions: Referral to alternative/primary 

care provider.

## 2018-10-26 NOTE — EMERGENCY DEPARTMENT RECORD
History of Present Illness





- General


Chief complaint: Pain


Stated complaint: HIP PAIN


Time Seen by Provider: 10/26/18 23:17


Source: Patient, EMS


Mode of Arrival: EMS


Limitations: No limitations





- History of Present Illness


Initial comments: 





53 yo female presents to ED for evaluation of pain and possible dislocation to 

the left hip.  Patient reports that she was walking down a ramp tonight when 

she felt her hip dislocate again.  Patient reports numerous previous 

dislocations s/p revision and "cap" placement 3 months ago.  Patient denies 

health problems at her baseline.


MD Complaint: Joint pain


Onset/Timin


-: Hour(s)


Location: Left


History of Same: Yes


-: Yes Arthralgia


Quality: Aching


Consistency: Constant


Improves with: Nothing


Worsens with: Nothing


Associated Symptoms: Denies other symptoms





- Related Data


 Allergies











Allergy/AdvReac Type Severity Reaction Status Date / Time


 


cephalexin [From Keflex] AdvReac  DIARRHEA Verified 10/26/18 23:17


 


naproxen AdvReac  VOMITING Verified 10/26/18 23:17














Review of Systems


Constitutional: Denies: Chills, Fever, Malaise, Night sweats


Eyes: Denies: Eye discharge, Eye pain


ENT: Denies: Congestion, Ear pain, Epistaxis


Respiratory: Denies: Cough, Dyspnea


Cardiovascular: Denies: Chest pain, Dyspnea on exertion


Endocrine: Denies: Fatigue, Heat or cold intolerance


Gastrointestinal: Denies: Abdominal pain, Nausea, Vomiting


Genitourinary: Denies: Incontinence, Retention


Musculoskeletal: Reports: Arthralgia.  Denies: Back pain, Gout


Skin: Denies: Bruising, Change in color


Neurological: Denies: Abnormal gait, Confusion, Headache, Seizure


Psychiatric: Denies: Anxiety


Hematological/Lymphatic: Denies: Anemia, Blood Clots





Past Medical History





- SOCIAL HISTORY


Smoking Status: Current every day smoker


Drug Use: None





- RESPIRATORY


Hx Respiratory Disorders: Yes


Hx COPD: Yes





- CARDIOVASCULAR


Hx Cardio Disorders: Yes


Hx Edema: Yes (from hip)





- NEURO


Hx Neuro Disorders: Yes


Hx of Migraines: Yes





- GI


Hx GI Disorders: Yes


Hx Hiatal Hernia: Yes





- 


Hx Genitourinary Disorders: Yes


Hx Kidney Stones: Yes





- ENDOCRINE


Hx Endocrine Disorders: Yes


Hx Diabetes: No


Hx Thyroid Disease: Yes





- MUSCULOSKELETAL


Hx Musculoskeletal Disorders: Yes


Comment:: Right hip problems  and Scoliosis





- PSYCH


Hx Psych Problems: Yes


Comment:: Bipolar





- HEMATOLOGY/ONCOLOGY


Hx Hematology/Oncology Disorders: No





Family Medical History


Family Hx Comment (NOT TO BE USED IN PLACE OF ITEMS BELOW): "don't really know 

too much about family history"


*Alcohol Comment: Brother OD on Methadone.


Hx Cancer: Mother





Physical Exam





- General


General Appearance: Alert, Oriented x3, Cooperative, Moderate distress


Limitations: No limitations





- Head


Head exam: Atraumatic, Normocephalic, Normal inspection


Head exam detail: negative: Abrasion, Contusion, Orozco's sign, General 

tenderness, Hematoma, Laceration





- Eye


Eye exam: Normal appearance.  negative: Conjunctival injection, Periorbital 

swelling, Periorbital tenderness, Scleral icterus





- ENT


Ear exam: negative: Auricular hematoma, Auricular trauma


Nasal Exam: negative: Active bleeding, Discharge, Dried blood, Foreign body


Mouth exam: negative: Drooling, Laceration, Muffled voice, Tongue elevation





- Neck


Neck exam: Normal inspection.  negative: Meningismus, Tenderness





- Respiratory


Respiratory exam: Normal lung sounds bilaterally.  negative: Rales, Respiratory 

distress, Rhonchi, Stridor





- Cardiovascular


Cardiovascular Exam: Regular rate, Normal rhythm, Normal heart sounds





- GI/Abdominal


GI/Abdominal exam: Soft.  negative: Rebound, Rigid, Tenderness





- Rectal


Rectal exam: Deferred





- 


 exam: Deferred





- Extremities


Extremities exam: Tenderness, Other (External rotation of the left hip, TTP 

over the proximal femur, strong DPP left.).  negative: Calf tenderness, Pedal 

edema





- Back


Back exam: Denies: CVA tenderness (R), CVA tenderness (L)





- Neurological


Neurological exam: Alert, Normal gait, Oriented X3





- Psychiatric


Psychiatric exam: Normal affect, Normal mood





- Skin


Skin exam: Normal color.  negative: Abrasion


Type of lesion: negative: abrasion





Course





- Reevaluation(s)


Reevaluation #1: 





10/26/18 23:50


Radiographs of the left hip were reviewed and appear c/w anterior dislocation.


Will consent for conscious sedation and reduction of the dislocation.


Reevaluation #2: 





10/27/18 00:19


Post-reduction films reviewed:


Satisfactory reduction of the patient's left hip.


Reevaluation #3: 





10/27/18 00:45


Patient ambulating with walker, laughing with staff, and tolerating PO fluids.


Patient appears stable for discharge at this time with instructions to follow-

up with Dr. Castillo/Cesia next week.





Procedures





- Orthopedic Joint Reduction


  ** Joint #1


Consent Obtained: Verbal consent, Written consent


Time Out Performed: Yes


Side: Left


Joint Reduction Location: Hip


Analgesia: None


Shoulder Technique Used (if applicable): Traction/counter-traction


Technique Used: Traction/counter-traction


Post-Reduction Neuro Exam: Intact


Post-Reduction Vascular Exam: Intact


Post Reduction X-Ray Obtained: Yes


Post Reduction X-Ray Results: Reduced


Splint Applied: No


Patient Tolerated Procedure: Good





- Procedural Sedation


Indications: fracture/dislocation redu


ASA Class: II


Mallampati Airway Score: 2


Preparation: cardiac monitor applied, pulse oximeter, supplemental O2 applied, 

suction/airway equipment at bedside, IV secured


Midazolam: IV


Dosage Used (#mgs): 2


IV Etomidate Dose (mgs): 24


Complications: none


Interventions: oxygen applied


Patient Tolerated Procedure: Good, No complications


Sedation Start Date: 10/27/18


Sedation Start Time: 00:02


Sedation End Date: 10/27/18


Sedation End Time: 00:11





Disposition


Disposition: Discharge


Clinical Impression: 


Hip dislocation, left


Qualifiers:


 Encounter type: initial encounter Qualified Code(s): S73.005A - Unspecified 

dislocation of left hip, initial encounter





Disposition: Home, Self-Care


Condition: (2) Stable


Instructions:  Hip Dislocation (ED)


Additional Instructions: 


Return to ED if your symptoms worsen or if you have any concerns.


Follow-up with your orthopedist in 3-5 days as directed.


Forms:  Patient Portal Access


Time of Disposition: 00:46





Quality





- Quality Measures


Quality Measures: N/A





- Blood Pressure Screening


Does Patient Have Any of the Following: No


Blood Pressure Classification: Pre-Hypertensive BP Reading


Systolic Measurement: 120


Diastolic Measurement: 85


Screening for High Blood Pressure: < Pre-Hypertensive BP, F/U Documented > [

]


Pre-Hypertensive Follow-up Interventions: Referral to alternative/primary care 

provider.

## 2018-10-29 NOTE — RADIOLOGY REPORT
EXAM:  LEFT HIP



HISTORY:  LEFT HIP DISLOCATION STATUS POST REDUCTION.  



TECHNIQUE:  AP and crosstable lateral views of the left hip were obtained.  



Comparison:  Left hip radiograph 10/26/18.  



FINDINGS:  Indeterminate artifact obscuring visualization on crosstable lateral 
view despite reattempt at imaging.  



Successful interval reduction of left femoral acetabular arthroplasty hardware 
components.  No acute fracture is appreciated although there is significant 
artifact on the lateral view as noted above.  



There is approximately 2-3 mm lucency surrounding the medial and inferior 
aspects of the acetabular prosthetic cup which is overall similar from 6/25/18 
comparison.  



IMPRESSION:  

SUCCESSFUL INTERVAL REDUCTION OF LEFT FEMORAL ACETABULAR ARTHROPLASTY HARDWARE.
  



JOB NUMBER:  [id number]
MTDD

## 2018-10-29 NOTE — RADIOLOGY REPORT
EXAM:  LEFT HIP



HISTORY:  HIP DISLOCATION.  



TECHNIQUE:  AP view of the pelvis and AP and crosstable lateral views of the 
left hip were obtained.  



Comparison:  Hip radiographs 6/25/18.  



FINDINGS:  Bilateral femoral acetabular total arthroplasties.  



Anterior superior dislocation of the left femoral head prosthesis relative to 
the acetabular cup.  No definite acute fracture is seen.  



Partially visualized lumbar spine effusion.  



IMPRESSION:  

AS ABOVE.  



JOB NUMBER:  [id number]
MTDD

## 2018-11-03 NOTE — EMERGENCY DEPARTMENT RECORD
History of Present Illness





- General


Chief complaint: Rash


Stated complaint: RASH


Time Seen by Provider: 18 11:41


Mode of Arrival: Ambulatory





- History of Present Illness


Initial comments: 


two ulcers on the left side of her tongue. and she has a red race with dry skin 

and flaking skin and it is bilateral face. Patient thinks it is herpes and 

wants a medication for herpes and this looks like apthous ulcers.     





Onset/Timin


-: Days(s)


Hx Tetanus Toxoid Vaccination: Yes


Year of Tetanus Vaccination: 


Location: Face


Severity: Mild


Quality: Other


Consistency: Constant, Getting worse


Improves with: None


Worsens with: None


Context: None


Associated symptoms: Denies other symptoms


Treatments Prior to Arrival: None





- Related Data


 Previous Rx's











 Medication  Instructions  Recorded


 


Acyclovir [Zovirax] 400 mg PO TID #21 tablet 18


 


Prednisone [Prednisone 10Mg] 10 mg PO ASDIR #30 tab 18


 


Triamcinolone Acet Cream [Kenalog 1 apply TP BID #30 gm 18





Cream]  











 Allergies











Allergy/AdvReac Type Severity Reaction Status Date / Time


 


cephalexin [From Keflex] AdvReac  DIARRHEA Verified 18 10:58


 


naproxen AdvReac  VOMITING Verified 18 10:58














Travel Screening





- Travel/Exposure Within Last 30 Days


Have you traveled within the last 30 days?: No





- Travel/Exposure Within Last Year


Have you traveled outside the [place] in the last year?: No





- Additonal Travel Details


Have you been exposed to anyone with a communicable illness?: No





- Travel Symptoms


Symptom Screening: None





Review of Systems


Reviewed: No additional complaints except as noted below


Constitutional: Reports: As per HPI.  Denies: Chills, Fever, Malaise, Night 

sweats, Weakness, Weight change


Eyes: Reports: As per HPI.  Denies: Eye discharge, Eye pain, Photophobia, 

Vision change


ENT: Reports: As per HPI, Other (tongue ulcers).  Denies: Congestion, Dental 

pain, Ear pain, Epistaxis, Hearing loss, Throat pain


Respiratory: Reports: As per HPI.  Denies: Cough, Dyspnea, Hemoptysis, Stridor, 

Wheezes


Cardiovascular: Reports: As per HPI.  Denies: Arrhythmia, Chest pain, Dyspnea 

on exertion, Edema, Murmurs, Orthopnea, Palpitations, Paroxysmal nocturnal 

dyspnea, Rheumatic Fever, Syncope


Endocrine: Reports: As per HPI.  Denies: Fatigue, Heat or cold intolerance, 

Polydipsia, Polyuria


Gastrointestinal: Reports: As per HPI.  Denies: Abdominal pain, Constipation, 

Diarrhea, Hematemesis, Hematochezia, Melena, Nausea, Vomiting


Genitourinary: Reports: As per HPI.  Denies: Abnormal menses, Discharge, 

Dyspareunia, Dysuria, Frequency, Hematuria, Incontinence, Retention, Urgency


Musculoskeletal: Reports: As per HPI.  Denies: Arthralgia, Back pain, Gout, 

Joint swelling, Myalgia, Neck pain


Skin: Reports: As per HPI.  Denies: Bruising, Change in color, Change in hair/

nails, Lesions, Pruritus, Rash


Neurological: Reports: As per HPI.  Denies: Abnormal gait, Confusion, Headache, 

Numbness, Paresthesias, Seizure, Tingling, Tremors, Vertigo, Weakness


Psychiatric: Reports: As per HPI.  Denies: Anxiety, Auditory hallucinations, 

Depression, Homicidal thoughts, Suicidal thoughts, Visual hallucinations


Hematological/Lymphatic: Reports: As per HPI.  Denies: Anemia, Blood Clots, 

Easy bleeding, Easy bruising, Swollen glands





Past Medical History





- SOCIAL HISTORY


Smoking Status: Current every day smoker


Alcohol Use: Rare


Drug Use: Occasional


Drug Use Detail:: Marijuana





- RESPIRATORY


Hx Respiratory Disorders: Yes


Hx COPD: Yes





- CARDIOVASCULAR


Hx Cardio Disorders: Yes


Hx Edema: Yes (from hip)





- NEURO


Hx Neuro Disorders: Yes


Hx of Migraines: Yes





- GI


Hx GI Disorders: Yes


Hx Hiatal Hernia: Yes





- 


Hx Genitourinary Disorders: Yes


Hx Kidney Stones: Yes





- ENDOCRINE


Hx Endocrine Disorders: Yes


Hx Diabetes: No


Hx Thyroid Disease: Yes





- MUSCULOSKELETAL


Hx Musculoskeletal Disorders: Yes


Comment:: Right hip problems  and Scoliosis





- PSYCH


Hx Psych Problems: Yes


Comment:: Bipolar





- HEMATOLOGY/ONCOLOGY


Hx Hematology/Oncology Disorders: No





Family Medical History


Any Significant Family History?: No


Family Hx Comment (NOT TO BE USED IN PLACE OF ITEMS BELOW): "don't really know 

too much about family history"


*Alcohol Comment: Brother OD on Methadone.


Hx Cancer: Mother





Physical Exam





- General


General Appearance: Alert, Oriented x3, Cooperative, No acute distress





- Head


Head exam: Normal inspection





- Eye


Eye exam: Normal appearance, PERRL, Other (rash on face around mouth and center 

of forehead)


Pupils: Normal accommodation





- ENT


ENT exam: Normal exam, Mucous membranes moist, Normal external ear exam, Normal 

orophraynx, TM's normal bilaterally


Ear exam: Normal external inspection.  negative: External canal tenderness


Nasal Exam: Normal inspection.  negative: Discharge, Sinus tenderness


Mouth exam: Normal external inspection, Tongue normal


Teeth exam: Normal inspection.  negative: Dental caries


Throat exam: Normal inspection.  negative: Tonsillar erythema, Tonsillar exudate





- Neck


Neck exam: Normal inspection, Full ROM.  negative: Tenderness





- Respiratory


Respiratory exam: Normal lung sounds bilaterally.  negative: Respiratory 

distress





- Cardiovascular


Cardiovascular Exam: Regular rate, Normal rhythm, Normal heart sounds





- GI/Abdominal


GI/Abdominal exam: Soft, Normal bowel sounds.  negative: Tenderness





- Rectal


Rectal exam: Deferred





- 


 exam: Deferred





- Extremities


Extremities exam: Normal inspection, Full ROM, Normal capillary refill.  

negative: Tenderness





- Back


Back exam: Reports: Normal inspection, Full ROM.  Denies: Muscle spasm, Rash 

noted, Tenderness





- Neurological


Neurological exam: Alert, Normal gait, Oriented X3, Reflexes normal





- Psychiatric


Psychiatric exam: Normal affect, Normal mood





- Skin


Skin exam: Dry, Rash, Other (flaking skin )





Course





 Vital Signs











  18





  10:53


 


Temperature 97.8 F


 


Pulse Rate 87


 


Respiratory 20





Rate 


 


Blood Pressure 127/92


 


Pulse Ox 96














Disposition


Clinical Impression: 


 Dermatitis, Aphthous stomatitis





Disposition: Home, Self-Care


Condition: (1) Good


Instructions:  Acute Rash (ED)


Additional Instructions: 


follow up with family  in 4 days


Prescriptions: 


Acyclovir [Zovirax] 400 mg PO TID #21 tablet


Prednisone [Prednisone 10Mg] 10 mg PO ASDIR #30 tab


Triamcinolone Acet Cream [Kenalog Cream] 1 apply TP BID #30 gm


Forms:  Patient Portal Access





Quality





- Quality Measures


Quality Measures: N/A





- Blood Pressure Screening


Does Patient Have Any of the Following: No


Blood Pressure Classification: Hypertensive Reading


Systolic Measurement: 127


Diastolic Measurement: 92


Screening for High Blood Pressure: < Pre-Hypertensive BP, F/U Documented > [

]


Pre-Hypertensive Follow-up Interventions: Referral to alternative/primary care 

provider.

## 2019-07-23 NOTE — EMERGENCY DEPARTMENT RECORD
History of Present Illness





- General


Chief complaint: Pain


Stated complaint: HIP DISLOCATION


Time Seen by Provider: 19 17:23


Source: Patient, EMS


Mode of Arrival: EMS


Limitations: No limitations





- History of Present Illness


Initial comments: 





pt has dislocated her hip again.  it has been out many times [15]  she has 

bilateral hip replacements


MD Complaint: Joint pain


Onset/Timin


-: Hour(s)


Location: Left, Other


Severity scale (1-10): 8


Consistency: Intermittent


Improves with: Immobilization


Worsens with: Exertion, Palpation, Walking, Weight bearing


Associated Symptoms: Denies other symptoms





- Related Data


                                Home Medications











 Medication  Instructions  Recorded  Confirmed  Last Taken


 


Buprenorphine HCl/Naloxone HCl 1 each SL TID 19 Unknown





[Suboxone 8 mg-2 mg Sl Film]    


 


Hydrocodone/Acetaminophen [Norco 1 each PO ASDIR 19 Unknown





 Tablet]    


 


Ibuprofen 800 mg PO ASDIR 19 Unknown











                                    Allergies











Allergy/AdvReac Type Severity Reaction Status Date / Time


 


cephalexin [From Keflex] AdvReac  DIARRHEA Verified 18 10:58


 


naproxen AdvReac  VOMITING Verified 18 10:58














Travel Screening





- Travel/Exposure Within Last 30 Days


Have you traveled within the last 30 days?: No





Review of Systems


Reviewed: No additional complaints except as noted below


Constitutional: Reports: As per HPI.  Denies: Chills, Fever, Malaise, Night 

sweats, Weakness, Weight change


Eyes: Reports: As per HPI.  Denies: Eye discharge, Eye pain, Photophobia, Vision

change


ENT: Reports: As per HPI.  Denies: Congestion, Dental pain, Ear pain, Epistaxis,

Hearing loss, Throat pain


Respiratory: Reports: As per HPI.  Denies: Cough, Dyspnea, Hemoptysis, Stridor, 

Wheezes


Cardiovascular: Reports: As per HPI.  Denies: Arrhythmia, Chest pain, Dyspnea on

exertion, Edema, Murmurs, Orthopnea, Palpitations, Paroxysmal nocturnal dyspnea,

Rheumatic Fever, Syncope


Endocrine: Reports: As per HPI.  Denies: Fatigue, Heat or cold intolerance, 

Polydipsia, Polyuria


Gastrointestinal: Reports: As per HPI.  Denies: Abdominal pain, Constipation, 

Diarrhea, Hematemesis, Hematochezia, Melena, Nausea, Vomiting


Genitourinary: Reports: As per HPI.  Denies: Abnormal menses, Discharge, 

Dyspareunia, Dysuria, Frequency, Hematuria, Incontinence, Retention, Urgency


Musculoskeletal: Reports: As per HPI.  Denies: Arthralgia, Back pain, Gout, 

Joint swelling, Myalgia, Neck pain


Skin: Reports: As per HPI.  Denies: Bruising, Change in color, Change in 

hair/nails, Lesions, Pruritus, Rash


Neurological: Reports: As per HPI.  Denies: Abnormal gait, Confusion, Headache, 

Numbness, Paresthesias, Seizure, Tingling, Tremors, Vertigo, Weakness


Psychiatric: Reports: As per HPI.  Denies: Anxiety, Auditory hallucinations, 

Depression, Homicidal thoughts, Suicidal thoughts, Visual hallucinations


Hematological/Lymphatic: Reports: As per HPI.  Denies: Anemia, Blood Clots, Easy

bleeding, Easy bruising, Swollen glands





Past Medical History





- SOCIAL HISTORY


Smoking Status: Current every day smoker





- RESPIRATORY


Hx Respiratory Disorders: Yes


Hx COPD: Yes





- CARDIOVASCULAR


Hx Cardio Disorders: Yes


Hx Edema: Yes (from hip)





- NEURO


Hx Neuro Disorders: Yes


Hx of Migraines: Yes





- GI


Hx GI Disorders: Yes


Hx Hiatal Hernia: Yes





- 


Hx Genitourinary Disorders: Yes


Hx Kidney Stones: Yes





- ENDOCRINE


Hx Endocrine Disorders: Yes


Hx Diabetes: No


Hx Thyroid Disease: Yes





- MUSCULOSKELETAL


Hx Musculoskeletal Disorders: Yes


Comment:: Right hip problems  and Scoliosis





- PSYCH


Hx Psych Problems: Yes


Comment:: Bipolar





- HEMATOLOGY/ONCOLOGY


Hx Hematology/Oncology Disorders: No





Family Medical History


Any Significant Family History?: Yes


Family Hx Comment (NOT TO BE USED IN PLACE OF ITEMS BELOW): "don't really know 

too much about family history"


*Alcohol Comment: Brother OD on Methadone.


Hx Cancer: Mother





Physical Exam





- General


General Appearance: Alert, Oriented x3, Cooperative, Moderate distress





- Head


Head exam: Normal inspection





- Eye


Eye exam: Normal appearance, PERRL, EOMI


Pupils: Normal accommodation





- ENT


ENT exam: Normal exam, Mucous membranes moist, Normal external ear exam, Normal 

orophraynx


Ear exam: Normal external inspection.  negative: External canal tenderness


Nasal Exam: Normal inspection.  negative: Discharge, Sinus tenderness


Mouth exam: Normal external inspection, Tongue normal


Teeth exam: Normal inspection.  negative: Dental caries


Throat exam: Normal inspection.  negative: Tonsillar erythema, Tonsillar exudate





- Neck


Neck exam: Normal inspection, Full ROM.  negative: Tenderness





- Respiratory


Respiratory exam: Normal lung sounds bilaterally.  negative: Respiratory 

distress





- Cardiovascular


Cardiovascular Exam: Regular rate, Normal rhythm, Normal heart sounds





- GI/Abdominal


GI/Abdominal exam: Soft, Normal bowel sounds.  negative: Tenderness





- Rectal


Rectal exam: Deferred





- 


 exam: Deferred





- Extremities


Extremities exam: Normal capillary refill, Tenderness.  negative: Full ROM





- Back


Back exam: Reports: Normal inspection, Full ROM.  Denies: Muscle spasm, Rash 

noted, Tenderness





- Neurological


Neurological exam: Alert, CN II-XII intact, Normal gait, Oriented X3





- Psychiatric


Psychiatric exam: Normal affect, Normal mood





- Skin


Skin exam: Dry, Intact, Normal color, Warm





Course





                                   Vital Signs











  19





  17:35


 


Temperature 98.4 F


 


Pulse Rate 85


 


Respiratory 16





Rate 


 


Blood Pressure 132/89


 


Pulse Ox 96














- Reevaluation(s)


Reevaluation #1: 





19 18:19


pt is refusing transfer by ambulance but said she will go by car.  i told her 

this is against medical advice





Disposition


Disposition: Transfer


Clinical Impression: 


Hip dislocation, left


Qualifiers:


 Encounter type: initial encounter Qualified Code(s): S73.005A - Unspecified di

slocation of left hip, initial encounter





Disposition: Acute Care Hospital Transfer


Transfer To: [facility]


Reason For Transfer: needs orthopedics


Accepting Physician: dr haynes


Time Discussed w/Accepting Physician: 18:18





Quality





- Quality Measures


Quality Measures: N/A





- Blood Pressure Screening


Does Patient Have Any of the Following: No


Blood Pressure Classification: Pre-Hypertensive BP Reading


Systolic Measurement: 132


Diastolic Measurement: 89


Screening for High Blood Pressure: < Pre-Hypertensive BP, F/U Documented > 

[]


Pre-Hypertensive Follow-up Interventions: Follow-up with rescreen every year.

## 2019-07-25 NOTE — RADIOLOGY REPORT
EXAM:  HIP,UNILAT, 2-3 VIEW LEFT



HISTORY:  LEFT HIP DISLOCATION. 



TECHNIQUE: Left hip, two views. 



COMPARISON: 10/27/18. 



FINDINGS:  Status post left total hip arthroplasty. The femoral prosthesis is 
dislocated superiorly from the acetabular component. No displaced fracture is 
identified. 



Lucency is noted around the sacral pedicle screws. 



IMPRESSION:  LEFT HIP DISLOCATION. 



JOB NUMBER: [id number]

MTDD

## 2019-07-26 NOTE — EMERGENCY DEPARTMENT RECORD
History of Present Illness





- General


Chief complaint: Lower Extremity Pain


Stated complaint: LEFT HIP PAIN/CHRONIC


Time Seen by Provider: 07/26/19 09:43


Source: Patient, RN notes reviewed


Mode of Arrival: EMS





- History of Present Illness


Initial comments: 





patient dislocated her left hip 3 hours ago and she said it happened 16 times 

before and her original surgery in [place] at [facility].  


Onset/Timing: 3


-: Hour(s)


Location: Left


History of Same: Yes


Radiation: Proximal, Distal


Severity scale (1-10): 10


Quality: Aching


Consistency: Constant


Improves with: Nothing


Worsens with: Nothing


Associated Symptoms: Denies other symptoms





- Related Data


                                    Allergies











Allergy/AdvReac Type Severity Reaction Status Date / Time


 


cephalexin [From Keflex] AdvReac  DIARRHEA Verified 11/03/18 10:58


 


naproxen AdvReac  VOMITING Verified 11/03/18 10:58














Travel Screening





- Travel/Exposure Within Last 30 Days


Have you traveled within the last 30 days?: No





Review of Systems


Reviewed: No additional complaints except as noted below


Constitutional: Reports: As per HPI.  Denies: Chills, Fever, Malaise, Night 

sweats, Weakness, Weight change


Eyes: Reports: As per HPI.  Denies: Eye discharge, Eye pain, Photophobia, Vision

change


ENT: Reports: As per HPI.  Denies: Congestion, Dental pain, Ear pain, Epistaxis,

Hearing loss, Throat pain


Respiratory: Reports: As per HPI.  Denies: Cough, Dyspnea, Hemoptysis, Stridor, 

Wheezes


Cardiovascular: Reports: As per HPI.  Denies: Arrhythmia, Chest pain, Dyspnea on

exertion, Edema, Murmurs, Orthopnea, Palpitations, Paroxysmal nocturnal dyspnea,

Rheumatic Fever, Syncope


Endocrine: Reports: As per HPI.  Denies: Fatigue, Heat or cold intolerance, 

Polydipsia, Polyuria


Gastrointestinal: Reports: As per HPI.  Denies: Abdominal pain, Constipation, 

Diarrhea, Hematemesis, Hematochezia, Melena, Nausea, Vomiting


Genitourinary: Reports: As per HPI.  Denies: Abnormal menses, Discharge, 

Dyspareunia, Dysuria, Frequency, Hematuria, Incontinence, Retention, Urgency


Musculoskeletal: Reports: As per HPI.  Denies: Arthralgia, Back pain, Gout, 

Joint swelling, Myalgia, Neck pain


Skin: Reports: As per HPI.  Denies: Bruising, Change in color, Change in 

hair/nails, Lesions, Pruritus, Rash


Neurological: Reports: As per HPI.  Denies: Abnormal gait, Confusion, Headache, 

Numbness, Paresthesias, Seizure, Tingling, Tremors, Vertigo, Weakness


Psychiatric: Reports: As per HPI.  Denies: Anxiety, Auditory hallucinations, 

Depression, Homicidal thoughts, Suicidal thoughts, Visual hallucinations


Hematological/Lymphatic: Reports: As per HPI.  Denies: Anemia, Blood Clots, Easy

bleeding, Easy bruising, Swollen glands





Past Medical History





- SOCIAL HISTORY


Smoking Status: Current every day smoker


Alcohol Use: None


Drug Use: None





- RESPIRATORY


Hx Respiratory Disorders: Yes


Hx COPD: Yes





- CARDIOVASCULAR


Hx Cardio Disorders: Yes


Hx Edema: Yes (from hip)





- NEURO


Hx Neuro Disorders: Yes


Hx of Migraines: Yes





- GI


Hx GI Disorders: Yes


Hx Hiatal Hernia: Yes





- 


Hx Genitourinary Disorders: Yes


Hx Kidney Stones: Yes





- ENDOCRINE


Hx Endocrine Disorders: Yes


Hx Diabetes: No


Hx Thyroid Disease: Yes





- MUSCULOSKELETAL


Hx Musculoskeletal Disorders: Yes


Comment:: Right hip problems  and Scoliosis





- PSYCH


Hx Psych Problems: Yes


Comment:: Bipolar





- HEMATOLOGY/ONCOLOGY


Hx Hematology/Oncology Disorders: No





Family Medical History


Any Significant Family History?: Yes


Family Hx Comment (NOT TO BE USED IN PLACE OF ITEMS BELOW): "don't really know 

too much about family history"


*Alcohol Comment: Brother OD on Methadone.


Hx Cancer: Mother





Physical Exam





- General


General Appearance: Alert, Oriented x3, Cooperative, No acute distress





- Head


Head exam: Normal inspection





- Eye


Eye exam: Normal appearance, PERRL


Pupils: Normal accommodation





- ENT


ENT exam: Normal exam, Mucous membranes moist, Normal external ear exam, Normal 

orophraynx, TM's normal bilaterally


Ear exam: Normal external inspection.  negative: External canal tenderness


Nasal Exam: Normal inspection.  negative: Discharge, Sinus tenderness


Mouth exam: Normal external inspection, Tongue normal


Teeth exam: Normal inspection.  negative: Dental caries


Throat exam: Normal inspection.  negative: Tonsillar erythema, Tonsillar exudate





- Neck


Neck exam: Normal inspection, Full ROM.  negative: Tenderness





- Respiratory


Respiratory exam: Normal lung sounds bilaterally.  negative: Respiratory 

distress





- Cardiovascular


Cardiovascular Exam: Regular rate, Normal rhythm, Normal heart sounds





- GI/Abdominal


GI/Abdominal exam: Soft, Normal bowel sounds.  negative: Tenderness





- Rectal


Rectal exam: Deferred





- 


 exam: Deferred





- Extremities


Extremities exam: Normal capillary refill, Tenderness (left hip pain and she has

an anterior dislocation)





- Back


Back exam: Reports: Normal inspection, Full ROM.  Denies: Muscle spasm, Rash 

noted, Tenderness





- Neurological


Neurological exam: Alert, Normal gait, Oriented X3, Reflexes normal





- Psychiatric


Psychiatric exam: Normal affect, Normal mood





- Skin


Skin exam: Dry, Intact, Normal color, Warm





Course





                                   Vital Signs











  07/26/19





  09:48


 


Temperature 98.1 F


 


Pulse Rate 86


 


Respiratory 18





Rate 


 


Blood Pressure 144/104


 


Pulse Ox 96














- Reevaluation(s)


Reevaluation #1: 


attempt reduction with the [facility] tech with conscious sedation but unable to 

reduce. Will transfer to orthopedics


07/26/19 11:43





Reevaluation #2: 


discussed case with Dr Mancia at [facility] and will transfer to the ED


07/26/19 11:56





Reevaluation #3: 


patient's vitals are good post conscious sedation and whe is trying to get up 

and go to the bathroom.


07/26/19 11:57








Medical Decision Making





- Lab Data


Result diagrams: 


                                 07/26/19 11:41





                                 07/26/19 11:41





Disposition


Clinical Impression: 


Hip dislocation, left


Qualifiers:


 Encounter type: initial encounter Qualified Code(s): S73.005A - Unspecified 

dislocation of left hip, initial encounter





Disposition: Acute Care Hospital Transfer


Condition: (1) Good


Forms:  Patient Portal Access


Time of Disposition: 11:45





Quality





- Quality Measures


Quality Measures: N/A





- Blood Pressure Screening


Does Patient Have Any of the Following: No


Blood Pressure Classification: Hypertensive Reading


Systolic Measurement: 144


Diastolic Measurement: 104


Screening for High Blood Pressure: < First Hypertensive BP, F/U Documented > 

[]


First Hypertensive Follow-up Interventions: Referral to alternative/primary care

 provider.

## 2019-07-27 NOTE — RADIOLOGY REPORT
EXAM:  HIP,UNILAT, 2-3 VIEW LEFT



HISTORY:  TOTAL LEFT HIP ARTHROPLASTY. HIP DISLOCATION. 



TECHNIQUE:  AP and crosstable lateral views of the left hip are obtained. 



COMPARISON: Unilateral left hip dated 07/23/2019. 



ENCOUNTER: Initial. 



FINDINGS:  There is normal bone mineralization. Total left hip arthroplasty 
changes are redemonstrated. There is anterior/superior dislocation of the 
prosthetic femoral head relative to the prosthetic acetabular cup. No definite 
acute fracture. No new lucency at the bone prosthesis interfaces. Extensive 
postsurgical changes are demonstrated within the lower spine, stable. 



IMPRESSION:  

TOTAL LEFT HIP ARTHROPLASTY REDEMONSTRATED. ANTERIOR/SUPERIOR DISLOCATION OF THE
PROSTHETIC FEMORAL HEAD. NO FRACTURE IDENTIFIED AT THIS TIME. 



JOB NUMBER: [id number]

MTDD

## 2020-01-13 ENCOUNTER — HOSPITAL ENCOUNTER (EMERGENCY)
Dept: HOSPITAL 59 - ER | Age: 55
Discharge: HOME | End: 2020-01-13
Payer: MEDICARE

## 2020-01-13 DIAGNOSIS — J02.9: Primary | ICD-10-CM

## 2020-01-13 DIAGNOSIS — J44.9: ICD-10-CM

## 2020-01-13 DIAGNOSIS — F17.210: ICD-10-CM

## 2020-01-13 PROCEDURE — 99282 EMERGENCY DEPT VISIT SF MDM: CPT

## 2020-01-13 NOTE — EMERGENCY DEPARTMENT RECORD
History of Present Illness





- General


Chief complaint: ENT


Stated complaint: SORE THROAT


Time Seen by Provider: 20 11:06


Source: Patient


Mode of Arrival: Ambulatory


Limitations: No limitations





- History of Present Illness


Initial comments: 





Pt to ED with one week of sore throat and horse voice.  No fever or chills.  No 

N/V/D, no cough.  Smoker.  No sick contacts. 





Onset/Timin


-: Week(s)


Location: Throat


Severity scale (1-10): 4


Quality: Aching


Consistency: Constant


Improves with: None


Worsens with: None





- Related Data


                                    Allergies











Allergy/AdvReac Type Severity Reaction Status Date / Time


 


cephalexin [From Keflex] AdvReac  DIARRHEA Verified 20 11:03


 


naproxen AdvReac  VOMITING Verified 20 11:03














Travel Screening





- Travel/Exposure Within Last 30 Days


Have you traveled within the last 30 days?: No





Review of Systems


Constitutional: Denies: Chills, Fever, Malaise


Eyes: Denies: Eye discharge, Vision change


ENT: Reports: As per HPI, Throat pain.  Denies: Congestion, Dental pain


Respiratory: Denies: Cough, Dyspnea


Cardiovascular: Denies: Chest pain, Syncope


Endocrine: Denies: Fatigue


Gastrointestinal: Denies: Diarrhea, Nausea, Vomiting


Musculoskeletal: Denies: Back pain


Skin: Denies: Rash


Neurological: Denies: Headache


Psychiatric: Denies: Anxiety


Hematological/Lymphatic: Denies: Anemia





Past Medical History





- SOCIAL HISTORY


Smoking Status: Current every day smoker


Alcohol Use: None


Drug Use: None





- RESPIRATORY


Hx Respiratory Disorders: Yes


Hx COPD: Yes





- CARDIOVASCULAR


Hx Cardio Disorders: Yes


Hx Edema: Yes (from hip)





- NEURO


Hx Neuro Disorders: Yes


Hx of Migraines: Yes





- GI


Hx GI Disorders: Yes


Hx Hiatal Hernia: Yes





- 


Hx Genitourinary Disorders: Yes


Hx Kidney Stones: Yes





- ENDOCRINE


Hx Endocrine Disorders: Yes


Hx Diabetes: No


Hx Thyroid Disease: Yes





- MUSCULOSKELETAL


Hx Musculoskeletal Disorders: Yes


Comment:: Right hip problems  and Scoliosis





- PSYCH


Hx Psych Problems: Yes


Comment:: Bipolar





- HEMATOLOGY/ONCOLOGY


Hx Hematology/Oncology Disorders: No





Family Medical History


Any Significant Family History?: Yes


Family Hx Comment (NOT TO BE USED IN PLACE OF ITEMS BELOW): "don't really know 

too much about family history"


*Alcohol Comment: Brother OD on Methadone.


Hx Cancer: Mother





Physical Exam





- General


General Appearance: Alert, Oriented x3, Cooperative, No acute distress





- Head


Head exam: Atraumatic





- Eye


Eye exam: Normal appearance, PERRL





- ENT


ENT exam: Mucous membranes moist, TM's normal bilaterally


Ear exam: Normal external inspection


Nasal Exam: Normal inspection


Throat exam: Normal inspection.  negative: Tonsillar erythema, Tonsillomegaly, 

Tonsillar exudate





- Neck


Neck exam: Normal inspection, Full ROM.  negative: Lymphadenopathy, Meningismus





- Respiratory


Respiratory exam: Normal lung sounds bilaterally.  negative: Respiratory 

distress, Rhonchi





- Cardiovascular


Cardiovascular Exam: Regular rate.  negative: Tachycardia





- GI/Abdominal


GI/Abdominal exam: Soft.  negative: Tenderness





- Extremities


Extremities exam: Normal inspection





- Neurological


Neurological exam: Alert, Normal gait, Oriented X3





- Psychiatric


Psychiatric exam: Normal affect, Normal mood





- Skin


Skin exam: Normal color.  negative: Rash





Course





                                   Vital Signs











  20





  11:00


 


Temperature 97.8 F


 


Pulse Rate 93 H


 


Respiratory 18





Rate 


 


Blood Pressure 162/102


 


Pulse Ox 99














- Reevaluation(s)


Reevaluation #1: 





20 11:10


seen and exam.  Symptoms and exam suggest viral etiology.  Discussed stopping 

smoking.  Pt agrees with plan. 





Medical Decision Making





- Management Options


MDM Management: No Additional Work-up Planned





Disposition


Disposition: Discharge


Clinical Impression: 


 Sore throat (viral)





Disposition: Home, Self-Care


Condition: (1) Good


Instructions:  Pharyngitis (ED)


Additional Instructions: 


Fluids, rest, tylenol as needed. 


Family doctor in 1 week.  


Return as needed. 


Forms:  Patient Portal Access


Time of Disposition: 11:08





Quality





- Quality Measures


Quality Measures: N/A





- Blood Pressure Screening


Does Patient Have Any of the Following: No


Blood Pressure Classification: Hypertensive Reading


Systolic Measurement: 162


Diastolic Measurement: 102


Screening for High Blood Pressure: < Pre-Hypertensive BP, F/U Documented > 

[]


Pre-Hypertensive Follow-up Interventions: Follow-up with rescreen every year.